# Patient Record
Sex: MALE | Race: BLACK OR AFRICAN AMERICAN | NOT HISPANIC OR LATINO | Employment: UNEMPLOYED | URBAN - METROPOLITAN AREA
[De-identification: names, ages, dates, MRNs, and addresses within clinical notes are randomized per-mention and may not be internally consistent; named-entity substitution may affect disease eponyms.]

---

## 2023-06-21 ENCOUNTER — HOSPITAL ENCOUNTER (INPATIENT)
Facility: HOSPITAL | Age: 23
LOS: 2 days | DRG: 817 | End: 2023-06-23
Attending: EMERGENCY MEDICINE | Admitting: STUDENT IN AN ORGANIZED HEALTH CARE EDUCATION/TRAINING PROGRAM
Payer: COMMERCIAL

## 2023-06-21 ENCOUNTER — APPOINTMENT (EMERGENCY)
Dept: RADIOLOGY | Facility: HOSPITAL | Age: 23
DRG: 817 | End: 2023-06-21
Payer: COMMERCIAL

## 2023-06-21 ENCOUNTER — APPOINTMENT (EMERGENCY)
Dept: CT IMAGING | Facility: HOSPITAL | Age: 23
DRG: 817 | End: 2023-06-21
Payer: COMMERCIAL

## 2023-06-21 DIAGNOSIS — T50.901A OVERDOSE: Primary | ICD-10-CM

## 2023-06-21 DIAGNOSIS — R45.851 SUICIDAL IDEATION: ICD-10-CM

## 2023-06-21 DIAGNOSIS — E43 SEVERE PROTEIN-CALORIE MALNUTRITION (HCC): ICD-10-CM

## 2023-06-21 PROBLEM — T44.3X1A ANTICHOLINERGIC DRUG OVERDOSE: Status: ACTIVE | Noted: 2023-06-21

## 2023-06-21 PROBLEM — R94.31 PROLONGED Q-T INTERVAL ON ECG: Status: ACTIVE | Noted: 2023-06-21

## 2023-06-21 PROBLEM — J96.00 ACUTE RESPIRATORY FAILURE (HCC): Status: ACTIVE | Noted: 2023-06-21

## 2023-06-21 PROBLEM — E87.20 METABOLIC ACIDOSIS: Status: ACTIVE | Noted: 2023-06-21

## 2023-06-21 LAB
ALBUMIN SERPL BCP-MCNC: 4.4 G/DL (ref 3.5–5)
ALP SERPL-CCNC: 38 U/L (ref 34–104)
ALT SERPL W P-5'-P-CCNC: 16 U/L (ref 7–52)
AMPHETAMINES SERPL QL SCN: NEGATIVE
ANION GAP SERPL CALCULATED.3IONS-SCNC: 19 MMOL/L
ANION GAP SERPL CALCULATED.3IONS-SCNC: 6 MMOL/L
APAP SERPL-MCNC: <10 UG/ML (ref 10–20)
AST SERPL W P-5'-P-CCNC: 25 U/L (ref 13–39)
BARBITURATES UR QL: NEGATIVE
BASE EX.OXY STD BLDV CALC-SCNC: 92.8 % (ref 60–80)
BASE EX.OXY STD BLDV CALC-SCNC: 97.3 % (ref 60–80)
BASE EXCESS BLDV CALC-SCNC: -0.3 MMOL/L
BASE EXCESS BLDV CALC-SCNC: -4.1 MMOL/L
BASOPHILS # BLD AUTO: 0.02 THOUSANDS/ÂΜL (ref 0–0.1)
BASOPHILS NFR BLD AUTO: 1 % (ref 0–1)
BENZODIAZ UR QL: NEGATIVE
BILIRUB DIRECT SERPL-MCNC: 0.14 MG/DL (ref 0–0.2)
BILIRUB SERPL-MCNC: 0.73 MG/DL (ref 0.2–1)
BUN SERPL-MCNC: 10 MG/DL (ref 5–25)
BUN SERPL-MCNC: 13 MG/DL (ref 5–25)
CALCIUM SERPL-MCNC: 8.2 MG/DL (ref 8.4–10.2)
CALCIUM SERPL-MCNC: 8.3 MG/DL (ref 8.4–10.2)
CHLORIDE SERPL-SCNC: 106 MMOL/L (ref 96–108)
CHLORIDE SERPL-SCNC: 107 MMOL/L (ref 96–108)
CK SERPL-CCNC: 248 U/L (ref 39–308)
CK SERPL-CCNC: 252 U/L (ref 39–308)
CO2 SERPL-SCNC: 13 MMOL/L (ref 21–32)
CO2 SERPL-SCNC: 23 MMOL/L (ref 21–32)
COCAINE UR QL: NEGATIVE
CREAT SERPL-MCNC: 1.13 MG/DL (ref 0.6–1.3)
CREAT SERPL-MCNC: 1.26 MG/DL (ref 0.6–1.3)
EOSINOPHIL # BLD AUTO: 0.02 THOUSAND/ÂΜL (ref 0–0.61)
EOSINOPHIL NFR BLD AUTO: 1 % (ref 0–6)
ERYTHROCYTE [DISTWIDTH] IN BLOOD BY AUTOMATED COUNT: 12 % (ref 11.6–15.1)
ETHANOL SERPL-MCNC: <10 MG/DL
GFR SERPL CREATININE-BSD FRML MDRD: 79 ML/MIN/1.73SQ M
GFR SERPL CREATININE-BSD FRML MDRD: 90 ML/MIN/1.73SQ M
GLUCOSE SERPL-MCNC: 121 MG/DL (ref 65–140)
GLUCOSE SERPL-MCNC: 132 MG/DL (ref 65–140)
GLUCOSE SERPL-MCNC: 98 MG/DL (ref 65–140)
HCO3 BLDV-SCNC: 18.6 MMOL/L (ref 24–30)
HCO3 BLDV-SCNC: 23 MMOL/L (ref 24–30)
HCT VFR BLD AUTO: 40.4 % (ref 36.5–49.3)
HGB BLD-MCNC: 13.8 G/DL (ref 12–17)
IMM GRANULOCYTES # BLD AUTO: 0.04 THOUSAND/UL (ref 0–0.2)
IMM GRANULOCYTES NFR BLD AUTO: 1 % (ref 0–2)
LACTATE SERPL-SCNC: 1.9 MMOL/L (ref 0.5–2)
LYMPHOCYTES # BLD AUTO: 0.59 THOUSANDS/ÂΜL (ref 0.6–4.47)
LYMPHOCYTES NFR BLD AUTO: 16 % (ref 14–44)
MAGNESIUM SERPL-MCNC: 2.3 MG/DL (ref 1.9–2.7)
MCH RBC QN AUTO: 30.8 PG (ref 26.8–34.3)
MCHC RBC AUTO-ENTMCNC: 34.2 G/DL (ref 31.4–37.4)
MCV RBC AUTO: 90 FL (ref 82–98)
METHADONE UR QL: NEGATIVE
MONOCYTES # BLD AUTO: 0.28 THOUSAND/ÂΜL (ref 0.17–1.22)
MONOCYTES NFR BLD AUTO: 7 % (ref 4–12)
NEUTROPHILS # BLD AUTO: 2.83 THOUSANDS/ÂΜL (ref 1.85–7.62)
NEUTS SEG NFR BLD AUTO: 74 % (ref 43–75)
NRBC BLD AUTO-RTO: 0 /100 WBCS
O2 CT BLDV-SCNC: 19.2 ML/DL
O2 CT BLDV-SCNC: 20.1 ML/DL
OPIATES UR QL SCN: NEGATIVE
OXYCODONE+OXYMORPHONE UR QL SCN: NEGATIVE
PCO2 BLDV: 28.1 MM HG (ref 42–50)
PCO2 BLDV: 33.9 MM HG (ref 42–50)
PCP UR QL: NEGATIVE
PH BLDV: 7.44 [PH] (ref 7.3–7.4)
PH BLDV: 7.45 [PH] (ref 7.3–7.4)
PLATELET # BLD AUTO: 205 THOUSANDS/UL (ref 149–390)
PLATELET # BLD AUTO: 210 THOUSANDS/UL (ref 149–390)
PMV BLD AUTO: 9.6 FL (ref 8.9–12.7)
PMV BLD AUTO: 9.9 FL (ref 8.9–12.7)
PO2 BLDV: 169 MM HG (ref 35–45)
PO2 BLDV: 61.2 MM HG (ref 35–45)
POTASSIUM SERPL-SCNC: 3.5 MMOL/L (ref 3.5–5.3)
POTASSIUM SERPL-SCNC: 4.5 MMOL/L (ref 3.5–5.3)
PROT SERPL-MCNC: 7.5 G/DL (ref 6.4–8.4)
RBC # BLD AUTO: 4.48 MILLION/UL (ref 3.88–5.62)
SALICYLATES SERPL-MCNC: <5 MG/DL (ref 3–20)
SODIUM SERPL-SCNC: 136 MMOL/L (ref 135–147)
SODIUM SERPL-SCNC: 138 MMOL/L (ref 135–147)
THC UR QL: NEGATIVE
WBC # BLD AUTO: 3.78 THOUSAND/UL (ref 4.31–10.16)

## 2023-06-21 PROCEDURE — 71260 CT THORAX DX C+: CPT

## 2023-06-21 PROCEDURE — 5A1935Z RESPIRATORY VENTILATION, LESS THAN 24 CONSECUTIVE HOURS: ICD-10-PCS | Performed by: INTERNAL MEDICINE

## 2023-06-21 PROCEDURE — 80179 DRUG ASSAY SALICYLATE: CPT | Performed by: EMERGENCY MEDICINE

## 2023-06-21 PROCEDURE — G1004 CDSM NDSC: HCPCS

## 2023-06-21 PROCEDURE — 0BH17EZ INSERTION OF ENDOTRACHEAL AIRWAY INTO TRACHEA, VIA NATURAL OR ARTIFICIAL OPENING: ICD-10-PCS | Performed by: INTERNAL MEDICINE

## 2023-06-21 PROCEDURE — 85025 COMPLETE CBC W/AUTO DIFF WBC: CPT | Performed by: EMERGENCY MEDICINE

## 2023-06-21 PROCEDURE — 82550 ASSAY OF CK (CPK): CPT | Performed by: EMERGENCY MEDICINE

## 2023-06-21 PROCEDURE — 99285 EMERGENCY DEPT VISIT HI MDM: CPT

## 2023-06-21 PROCEDURE — 82077 ASSAY SPEC XCP UR&BREATH IA: CPT | Performed by: EMERGENCY MEDICINE

## 2023-06-21 PROCEDURE — 74177 CT ABD & PELVIS W/CONTRAST: CPT

## 2023-06-21 PROCEDURE — 99449 NTRPROF PH1/NTRNET/EHR 31/>: CPT | Performed by: EMERGENCY MEDICINE

## 2023-06-21 PROCEDURE — 94760 N-INVAS EAR/PLS OXIMETRY 1: CPT

## 2023-06-21 PROCEDURE — 36415 COLL VENOUS BLD VENIPUNCTURE: CPT | Performed by: EMERGENCY MEDICINE

## 2023-06-21 PROCEDURE — 83735 ASSAY OF MAGNESIUM: CPT | Performed by: STUDENT IN AN ORGANIZED HEALTH CARE EDUCATION/TRAINING PROGRAM

## 2023-06-21 PROCEDURE — 80076 HEPATIC FUNCTION PANEL: CPT | Performed by: EMERGENCY MEDICINE

## 2023-06-21 PROCEDURE — 85049 AUTOMATED PLATELET COUNT: CPT

## 2023-06-21 PROCEDURE — 93005 ELECTROCARDIOGRAM TRACING: CPT

## 2023-06-21 PROCEDURE — 71045 X-RAY EXAM CHEST 1 VIEW: CPT

## 2023-06-21 PROCEDURE — 80143 DRUG ASSAY ACETAMINOPHEN: CPT | Performed by: EMERGENCY MEDICINE

## 2023-06-21 PROCEDURE — 82805 BLOOD GASES W/O2 SATURATION: CPT

## 2023-06-21 PROCEDURE — 99291 CRITICAL CARE FIRST HOUR: CPT | Performed by: EMERGENCY MEDICINE

## 2023-06-21 PROCEDURE — 80048 BASIC METABOLIC PNL TOTAL CA: CPT | Performed by: EMERGENCY MEDICINE

## 2023-06-21 PROCEDURE — 70450 CT HEAD/BRAIN W/O DYE: CPT

## 2023-06-21 PROCEDURE — 83605 ASSAY OF LACTIC ACID: CPT

## 2023-06-21 PROCEDURE — 80307 DRUG TEST PRSMV CHEM ANLYZR: CPT | Performed by: EMERGENCY MEDICINE

## 2023-06-21 PROCEDURE — 80048 BASIC METABOLIC PNL TOTAL CA: CPT | Performed by: STUDENT IN AN ORGANIZED HEALTH CARE EDUCATION/TRAINING PROGRAM

## 2023-06-21 PROCEDURE — 94002 VENT MGMT INPAT INIT DAY: CPT

## 2023-06-21 PROCEDURE — 82948 REAGENT STRIP/BLOOD GLUCOSE: CPT

## 2023-06-21 PROCEDURE — 82550 ASSAY OF CK (CPK): CPT | Performed by: STUDENT IN AN ORGANIZED HEALTH CARE EDUCATION/TRAINING PROGRAM

## 2023-06-21 PROCEDURE — 99291 CRITICAL CARE FIRST HOUR: CPT | Performed by: STUDENT IN AN ORGANIZED HEALTH CARE EDUCATION/TRAINING PROGRAM

## 2023-06-21 RX ORDER — SODIUM CHLORIDE, SODIUM GLUCONATE, SODIUM ACETATE, POTASSIUM CHLORIDE, MAGNESIUM CHLORIDE, SODIUM PHOSPHATE, DIBASIC, AND POTASSIUM PHOSPHATE .53; .5; .37; .037; .03; .012; .00082 G/100ML; G/100ML; G/100ML; G/100ML; G/100ML; G/100ML; G/100ML
125 INJECTION, SOLUTION INTRAVENOUS CONTINUOUS
Status: DISCONTINUED | OUTPATIENT
Start: 2023-06-21 | End: 2023-06-22

## 2023-06-21 RX ORDER — PROPOFOL 10 MG/ML
5-50 INJECTION, EMULSION INTRAVENOUS
Status: DISCONTINUED | OUTPATIENT
Start: 2023-06-21 | End: 2023-06-22

## 2023-06-21 RX ORDER — ENOXAPARIN SODIUM 100 MG/ML
40 INJECTION SUBCUTANEOUS DAILY
Status: DISCONTINUED | OUTPATIENT
Start: 2023-06-21 | End: 2023-06-23 | Stop reason: HOSPADM

## 2023-06-21 RX ORDER — CHLORHEXIDINE GLUCONATE ORAL RINSE 1.2 MG/ML
15 SOLUTION DENTAL EVERY 12 HOURS SCHEDULED
Status: DISCONTINUED | OUTPATIENT
Start: 2023-06-21 | End: 2023-06-23 | Stop reason: HOSPADM

## 2023-06-21 RX ORDER — FENTANYL CITRATE-0.9 % NACL/PF 10 MCG/ML
75 PLASTIC BAG, INJECTION (ML) INTRAVENOUS CONTINUOUS
Status: DISCONTINUED | OUTPATIENT
Start: 2023-06-21 | End: 2023-06-22

## 2023-06-21 RX ORDER — PROPOFOL 10 MG/ML
5-50 INJECTION, EMULSION INTRAVENOUS
Status: DISCONTINUED | OUTPATIENT
Start: 2023-06-21 | End: 2023-06-21

## 2023-06-21 RX ORDER — FAMOTIDINE 40 MG/5ML
20 POWDER, FOR SUSPENSION ORAL 2 TIMES DAILY
Status: DISCONTINUED | OUTPATIENT
Start: 2023-06-21 | End: 2023-06-22

## 2023-06-21 RX ORDER — MIDAZOLAM HYDROCHLORIDE 2 MG/2ML
2 INJECTION, SOLUTION INTRAMUSCULAR; INTRAVENOUS EVERY 4 HOURS PRN
Status: DISCONTINUED | OUTPATIENT
Start: 2023-06-21 | End: 2023-06-22

## 2023-06-21 RX ORDER — ACETAMINOPHEN 160 MG/5ML
650 SUSPENSION ORAL EVERY 4 HOURS PRN
Status: DISCONTINUED | OUTPATIENT
Start: 2023-06-21 | End: 2023-06-22

## 2023-06-21 RX ORDER — SODIUM CHLORIDE, SODIUM GLUCONATE, SODIUM ACETATE, POTASSIUM CHLORIDE, MAGNESIUM CHLORIDE, SODIUM PHOSPHATE, DIBASIC, AND POTASSIUM PHOSPHATE .53; .5; .37; .037; .03; .012; .00082 G/100ML; G/100ML; G/100ML; G/100ML; G/100ML; G/100ML; G/100ML
1000 INJECTION, SOLUTION INTRAVENOUS ONCE
Status: COMPLETED | OUTPATIENT
Start: 2023-06-21 | End: 2023-06-21

## 2023-06-21 RX ORDER — ETOMIDATE 2 MG/ML
20 INJECTION INTRAVENOUS ONCE
Status: COMPLETED | OUTPATIENT
Start: 2023-06-21 | End: 2023-06-21

## 2023-06-21 RX ORDER — SUCCINYLCHOLINE/SOD CL,ISO/PF 100 MG/5ML
1.5 SYRINGE (ML) INTRAVENOUS ONCE
Status: COMPLETED | OUTPATIENT
Start: 2023-06-21 | End: 2023-06-21

## 2023-06-21 RX ORDER — LORAZEPAM 2 MG/ML
1 INJECTION INTRAMUSCULAR ONCE
Status: COMPLETED | OUTPATIENT
Start: 2023-06-21 | End: 2023-06-21

## 2023-06-21 RX ORDER — POTASSIUM CHLORIDE 14.9 MG/ML
20 INJECTION INTRAVENOUS ONCE
Status: COMPLETED | OUTPATIENT
Start: 2023-06-21 | End: 2023-06-21

## 2023-06-21 RX ADMIN — PROPOFOL 30 MCG/KG/MIN: 10 INJECTION, EMULSION INTRAVENOUS at 13:56

## 2023-06-21 RX ADMIN — CHLORHEXIDINE GLUCONATE 0.12% ORAL RINSE 15 ML: 1.2 LIQUID ORAL at 12:43

## 2023-06-21 RX ADMIN — ACETAMINOPHEN 650 MG: 650 SUSPENSION ORAL at 16:31

## 2023-06-21 RX ADMIN — FAMOTIDINE 20 MG: 40 POWDER, FOR SUSPENSION ORAL at 14:01

## 2023-06-21 RX ADMIN — Medication 150 MG: at 09:46

## 2023-06-21 RX ADMIN — ENOXAPARIN SODIUM 40 MG: 40 INJECTION SUBCUTANEOUS at 12:43

## 2023-06-21 RX ADMIN — LORAZEPAM 1 MG: 2 INJECTION INTRAMUSCULAR; INTRAVENOUS at 12:56

## 2023-06-21 RX ADMIN — Medication 75 MCG/HR: at 21:41

## 2023-06-21 RX ADMIN — CHLORHEXIDINE GLUCONATE 0.12% ORAL RINSE 15 ML: 1.2 LIQUID ORAL at 22:00

## 2023-06-21 RX ADMIN — IOHEXOL 100 ML: 350 INJECTION, SOLUTION INTRAVENOUS at 10:16

## 2023-06-21 RX ADMIN — POTASSIUM CHLORIDE 20 MEQ: 14.9 INJECTION, SOLUTION INTRAVENOUS at 21:00

## 2023-06-21 RX ADMIN — PROPOFOL 50 MCG/KG/MIN: 10 INJECTION, EMULSION INTRAVENOUS at 19:41

## 2023-06-21 RX ADMIN — ETOMIDATE 30 MG: 2 INJECTION INTRAVENOUS at 09:45

## 2023-06-21 RX ADMIN — SODIUM CHLORIDE, SODIUM GLUCONATE, SODIUM ACETATE, POTASSIUM CHLORIDE, MAGNESIUM CHLORIDE, SODIUM PHOSPHATE, DIBASIC, AND POTASSIUM PHOSPHATE 1000 ML: .53; .5; .37; .037; .03; .012; .00082 INJECTION, SOLUTION INTRAVENOUS at 21:00

## 2023-06-21 RX ADMIN — PROPOFOL 5 MCG/KG/MIN: 10 INJECTION, EMULSION INTRAVENOUS at 09:41

## 2023-06-21 RX ADMIN — SODIUM CHLORIDE, SODIUM GLUCONATE, SODIUM ACETATE, POTASSIUM CHLORIDE, MAGNESIUM CHLORIDE, SODIUM PHOSPHATE, DIBASIC, AND POTASSIUM PHOSPHATE 100 ML/HR: .53; .5; .37; .037; .03; .012; .00082 INJECTION, SOLUTION INTRAVENOUS at 12:23

## 2023-06-21 RX ADMIN — PROPOFOL 50 MCG/KG/MIN: 10 INJECTION, EMULSION INTRAVENOUS at 20:20

## 2023-06-21 NOTE — RESPIRATORY THERAPY NOTE
RT Ventilator Management Note  Sue Duenas 24 y o  male MRN: [id number]  Unit/Bed#:  Encounter: [id number]      Daily Screen         6/21/2023  0931             Patient safety screen outcome[de-identified] Failed    Not Ready for Weaning due to[de-identified] Underline problem not resolved              Physical Exam:   Assessment Type: Assess only  General Appearance: Sedated  Respiratory Pattern: Assisted  Chest Assessment: Chest expansion symmetrical  Bilateral Breath Sounds: Clear  Suction: ET Tube      Resp Comments: decreased rate to 14       06/21/23 1534   Respiratory Assessment   Resp Comments decreased rate to 14   Vent Information   Vent ID Saundra   Vent type Drager   Drager Vent Mode AC/VC+   $ Pulse Oximetry Spot Check Charge Completed   SpO2 100 %   AC/VC+ Settings   Resp Rate (BPM) (S)  14 BPM   VT (mL) 400 mL   Insp Time (S) 1 S   FIO2 (%) 40 %   PEEP (cmH2O) 6 cmH2O   Rise Time (%) 0 2 %   Trigger Sensitivity Flow (LPM) 2 LPM   Humidification Heater   Heater Temp 98 6 °F (37 °C)   AC/VC+ Actuals   Resp Rate (BPM) 14 BPM   VT (mL) 400 mL   MV (Obs) 4 91   MAP (cmH2O) 8 2 cmH2O   Peak Pressure (cmH2O) 15 cmH2O   I:E Ratio (Obs) 1:3 3   Static Compliance (mL/cmH20) 56 mL/cmH2O   Plateau Pressure (cm H2O) 14 5 cm H2O   Heater Temperature (Obs) 99 5 °F (37 5 °C)   AC/VC+ ALARMS   High Peak Pressure (cmH2O) 45 cmH2O   High Resp Rate (BPM) 35 BPM   High MV (L/min) 11 L/min   High VT (mL) 900 mL   Maintenance   Alarm (pink) cable attached Yes   Resuscitation bag with peep valve at bedside Yes   Water bag changed No   Circuit changed No   ETT  7 mm   Placement Date/Time: 06/21/23 0930   Tube Size: 7 mm  Insertion attempts: 1  Placement Verification: End tidal CO2  Secured at (cm): 24 lip  Placed By: Physician   Secured at (cm) 24   Measured from [address]   Repositioned (S)  Center to Right   Secured by Commercial tube mcconnell   Site Condition Dry   Cuff Pressure (cm H2O)   (green)   HI-LO Suction  Continuous low suction   HI-LO Secretions Scant   HI-LO Intervention Patent

## 2023-06-21 NOTE — ED PROVIDER NOTES
History  Chief Complaint   Patient presents with   • Overdose - Accidental     Pt arrived via EMS after taking 30 50 mg benadryl to take a break , pt has lacerations on his right arm upon arrival  Pt was given Zofran in transport  EMS reports seizure activity during transport  HPI patient is a 43-year-old male, arrives via EMS reports patient took 30 tablets of 50 mg of Benadryl this morning to take a break  He denied a suicide attempt for them  Patient apparently was ambulatory according to police and then verbal according to EMS and then had a tonic-clonic terminal seizure followed by some vomiting  Arrives here obtunded  Appears to move his upper extremities spontaneously  Patient some eye movements looking around, pupils are constricted  His membranes were dry  Was unable to give a history  Apparently found with his girlfriend but his wife called from [place]  Because patient was unresponsive unable to control his airway he was intubated on arrival   Past medical history unknown  Family history unknown  Social history unknown      None       No past medical history on file  No past surgical history on file  No family history on file  I have reviewed and agree with the history as documented  No existing history information found  No existing history information found  Review of Systems   Unable to perform ROS: Intubated       Physical Exam  Physical Exam  Constitutional:       Comments: Unresponsive, occasionally looking around, I saw some spontaneous movement of the upper extremities but none of the lower extremities  Nonverbal, does not seem to respond to pain   HENT:      Head: Normocephalic  Nose: Nose normal       Mouth/Throat:      Mouth: Mucous membranes are dry  Comments: Very dry mucous membranes  Eyes:      Comments: Pinpoint extraocular motions intact   Cardiovascular:      Rate and Rhythm: Regular rhythm  Tachycardia present  Pulses: Normal pulses  Heart sounds: Normal heart sounds  Pulmonary:      Effort: Pulmonary effort is normal       Breath sounds: Normal breath sounds  Abdominal:      General: Abdomen is flat  Bowel sounds are normal       Tenderness: There is no abdominal tenderness  Musculoskeletal:         General: Normal range of motion  Cervical back: Normal range of motion  Skin:     Comments: Superficial lacerations of the left arm, cutting consistent with self cutting, EMS reports patient admitted to cutting himself   Neurological:      GCS: GCS eye subscore is 1  GCS verbal subscore is 1  GCS motor subscore is 1  Comments: Apparently ambulatory at the scene, somnolent for EMS and then apparently had a seizure now postictal versus somnolent from his overdose  Appears unresponsive to most painful stimuli    Did apparently have some spontaneous movement with intubation         Vital Signs  ED Triage Vitals   Temperature Pulse Respirations Blood Pressure SpO2   06/21/23 0947 06/21/23 0927 06/21/23 0927 06/21/23 0927 06/21/23 0927   (!) 96 6 °F (35 9 °C) (!) 138 (!) 23 123/58 100 %      Temp src Heart Rate Source Patient Position - Orthostatic VS BP Location FiO2 (%)   -- 06/21/23 1015 06/21/23 0927 06/21/23 0927 --    Monitor Lying Right arm       Pain Score       --                  Vitals:    06/21/23 0927 06/21/23 0942 06/21/23 1015   BP: 123/58 105/58 133/62   Pulse: (!) 138 (!) 126 (!) 109   Patient Position - Orthostatic VS: Lying Lying          Visual Acuity  Visual Acuity    Flowsheet Row Most Recent Value   L Pupil Size (mm) 3   R Pupil Size (mm) 3          ED Medications  Medications   propofol (DIPRIVAN) 1000 mg in 100 mL infusion (premix) (15 mcg/kg/min × 65 1 kg Intravenous Rate/Dose Change 6/21/23 1003)   Succinylcholine Chloride 100 mg/5 mL syringe 98 mg (150 mg Intravenous Given 6/21/23 0946)   etomidate (AMIDATE) 2 mg/mL injection 20 mg (30 mg Intravenous Given 6/21/23 0945)   iohexol (OMNIPAQUE) 350 MG/ML injection (SINGLE-DOSE) 100 mL (100 mL Intravenous Given 6/21/23 1016)       Diagnostic Studies  Results Reviewed     Procedure Component Value Units Date/Time    CBC and differential [[id number]]  (Abnormal) Collected: 06/21/23 1029    Lab Status: Final result Specimen: Blood from Arm, Left Updated: 06/21/23 1036     WBC 3 78 Thousand/uL      RBC 4 48 Million/uL      Hemoglobin 13 8 g/dL      Hematocrit 40 4 %      MCV 90 fL      MCH 30 8 pg      MCHC 34 2 g/dL      RDW 12 0 %      MPV 9 6 fL      Platelets 826 Thousands/uL      nRBC 0 /100 WBCs      Neutrophils Relative 74 %      Immat GRANS % 1 %      Lymphocytes Relative 16 %      Monocytes Relative 7 %      Eosinophils Relative 1 %      Basophils Relative 1 %      Neutrophils Absolute 2 83 Thousands/µL      Immature Grans Absolute 0 04 Thousand/uL      Lymphocytes Absolute 0 59 Thousands/µL      Monocytes Absolute 0 28 Thousand/µL      Eosinophils Absolute 0 02 Thousand/µL      Basophils Absolute 0 02 Thousands/µL     Basic metabolic panel [[id number]]  (Abnormal) Collected: 06/21/23 0947    Lab Status: Final result Specimen: Blood from Arm, Right Updated: 06/21/23 1029     Sodium 138 mmol/L      Potassium 4 5 mmol/L      Chloride 106 mmol/L      CO2 13 mmol/L      ANION GAP 19 mmol/L      BUN 13 mg/dL      Creatinine 1 26 mg/dL      Glucose 132 mg/dL      Calcium 8 3 mg/dL      eGFR 79 ml/min/1 73sq m     Narrative:      [place] guidelines for Chronic Kidney Disease (CKD):   •  Stage 1 with normal or high GFR (GFR > 90 mL/min/1 73 square meters)  •  Stage 2 Mild CKD (GFR = 60-89 mL/min/1 73 square meters)  •  Stage 3A Moderate CKD (GFR = 45-59 mL/min/1 73 square meters)  •  Stage 3B Moderate CKD (GFR = 30-44 mL/min/1 73 square meters)  •  Stage 4 Severe CKD (GFR = 15-29 mL/min/1 73 square meters)  •  Stage 5 End Stage CKD (GFR <15 mL/min/1 73 square meters)  Note: GFR calculation is accurate only with a steady state creatinine "Hepatic function panel [[id number]]  (Normal) Collected: 06/21/23 0947    Lab Status: Final result Specimen: Blood from Arm, Right Updated: 06/21/23 1029     Total Bilirubin 0 73 mg/dL      Bilirubin, Direct 0 14 mg/dL      Alkaline Phosphatase 38 U/L      AST 25 U/L      ALT 16 U/L      Total Protein 7 5 g/dL      Albumin 4 4 g/dL     CK [[id number]]  (Normal) Collected: 06/21/23 0947    Lab Status: Final result Specimen: Blood from Arm, Right Updated: 06/21/23 1029     Total  U/L     Salicylate level [[id number]]  (Normal) Collected: 06/21/23 0947    Lab Status: Final result Specimen: Blood from Arm, Right Updated: 69/75/96 0138     Salicylate Lvl <5 mg/dL     Acetaminophen level-\"If concentration is detectable, please discuss with medical  on call  \" [[id number]]  (Abnormal) Collected: 06/21/23 0947    Lab Status: Final result Specimen: Blood from Arm, Right Updated: 06/21/23 1028     Acetaminophen Level <10 ug/mL     Ethanol [[id number]]  (Normal) Collected: 06/21/23 0947    Lab Status: Final result Specimen: Blood from Arm, Right Updated: 06/21/23 1026     Ethanol Lvl <10 mg/dL     Rapid drug screen, urine [[id number]]  (Normal) Collected: 06/21/23 0947    Lab Status: Final result Specimen: Urine, Catheter Updated: 06/21/23 1020     Amph/Meth UR Negative     Barbiturate Ur Negative     Benzodiazepine Urine Negative     Cocaine Urine Negative     Methadone Urine Negative     Opiate Urine Negative     PCP Ur Negative     THC Urine Negative     Oxycodone Urine Negative    Narrative:      FOR MEDICAL PURPOSES ONLY  IF CONFIRMATION NEEDED PLEASE CONTACT THE LAB WITHIN 5 DAYS      Drug Screen Cutoff Levels:  AMPHETAMINE/METHAMPHETAMINES  1000 ng/mL  BARBITURATES     200 ng/mL  BENZODIAZEPINES     200 ng/mL  COCAINE      300 ng/mL  METHADONE      300 ng/mL  OPIATES      300 ng/mL  PHENCYCLIDINE     25 ng/mL  THC       50 ng/mL  OXYCODONE      100 ng/mL    Fingerstick Glucose (POCT) [[id number]]  (Normal) " Collected: 06/21/23 0933    Lab Status: Final result Updated: 06/21/23 0934     POC Glucose 121 mg/dl                  CT head without contrast   Final Result by Juliann Mckay MD (06/21 1044)      No acute intracranial abnormality  Workstation performed: [id number]         CT chest abdomen pelvis w contrast   Final Result by Sydney Andrade MD (06/21 1042)      No acute findings in the chest, abdomen, or pelvis  Workstation performed: [id number]         XR chest 1 view portable   Final Result by Margarette Fallon MD (06/21 1001)      ETT and NGT in place      No acute cardiopulmonary disease  Workstation performed: [id number]                    Procedures  ECG 12 Lead Documentation Only    Date/Time: 6/21/2023 9:51 AM    Performed by: Audie Daniels MD  Authorized by: Audie Daniels MD    Indications / Diagnosis:  OverDose, rule out metabolic issues  Previous ECG:     Previous ECG:  Unavailable  Interpretation:     Interpretation: abnormal    Rate:     ECG rate:  141    ECG rate assessment: normal    Rhythm:     Rhythm: sinus rhythm    Comments:      Sinus tachycardia, no QRS prolongation, no QT shortening  Intubation    Date/Time: 6/21/2023 9:53 AM    Performed by: Audie Daniels MD  Authorized by: Audie Daniels MD    Patient location:  ED  Consent:     Consent obtained:  Emergent situation    Risks discussed:  Aspiration  Universal protocol:     Patient identity confirmed:  Hospital-assigned identification number  Pre-procedure details:     Patient status:  Unresponsive    Mallampati score:  2    Pretreatment medications:  Etomidate    Paralytics:  Succinylcholine  Indications:     Indications for intubation: airway protection    Procedure details:     Preoxygenation:  Bag valve mask    CPR in progress: no      Intubation method:  Oral    Oral intubation technique:  Direct    Laryngoscope blade:   Mac 3    Tube size (mm):  7 0    Tube type:  Cuffed    Number of attempts:  1    Ventilation between attempts: no      Cricoid pressure: no      Tube visualized through cords: yes    Placement assessment:     ETT to lip:  25    ETT to teeth:  24    Tube secured with:  ETT mcconnell    Breath sounds:  Equal    Placement verification: chest rise and colorimetric ETCO2 device      CXR findings:  ETT in proper place  Post-procedure details:     Patient tolerance of procedure: Tolerated well, no immediate complications  CriticalCare Time    Date/Time: 6/21/2023 10:44 AM    Performed by: Nancy Sharma MD  Authorized by: Nancy Sharma MD    Critical care provider statement:     Critical care time (minutes):  60    Critical care start time:  6/21/2023 9:44 AM    Critical care end time:  6/21/2023 10:44 AM    Critical care time was exclusive of:  Separately billable procedures and treating other patients and teaching time    Critical care was necessary to treat or prevent imminent or life-threatening deterioration of the following conditions:  Respiratory failure and toxidrome    Critical care was time spent personally by me on the following activities:  Obtaining history from patient or surrogate, development of treatment plan with patient or surrogate, discussions with consultants, evaluation of patient's response to treatment, examination of patient, ventilator management, re-evaluation of patient's condition, ordering and review of radiographic studies and ordering and review of laboratory studies             ED Course      Chest x-ray: Chest x-ray showed a normal cardiac silhouette, no pneumothorax no infiltrates, ET tube is in good position no sign of pathology, interpreted by me, I was the primary   Other diagnostic testing showed white count of 3 7 actually reduced no sign of inflammation hemoglobin is normal 13 no sign of anemia  Electrolytes showed a normal serum sodium 138, anion gap 19, calcium minimally reduced at 8 3    Liver functions were normal   Total CK was not markedly elevated, salicylate and Tylenol were negative  Ethanol was negative  Drug screen was negative  Sugar was within normal limits  CT scan of the brain showed no acute pathology  CT scan of the abdomen pelvis showed no acute findings  Discussed the case with critical care and with toxicology  SBIRT 22yo+    Flowsheet Row Most Recent Value   Initial Alcohol Screen: US AUDIT-C     1  How often do you have a drink containing alcohol? 1 Filed at: 06/21/2023 1046   2  How many drinks containing alcohol do you have on a typical day you are drinking? 0 Filed at: 06/21/2023 1046   3a  Male UNDER 65: How often do you have five or more drinks on one occasion? 0 Filed at: 06/21/2023 1046   Audit-C Score 1 Filed at: 06/21/2023 1046   MOHIT: How many times in the past year have you    Used an illegal drug or used a prescription medication for non-medical reasons? Never Filed at: 06/21/2023 1046        I was able to talk with the woman who identified herself as the patient's wife when she arrived, she reports he took a nighttime sleeping aid which she has a picture of it appears to be Benadryl 50 mg tablets  She denies any other ingestion  She reports witnessing himself cut them self on his left arm with a knife at home  Medical Decision Making  Decision making 21-year-old male presents with an acute overdose, apparently ambulatory at the scene, according to police, EMS reports patient became increasingly somnolent and had a seizure for them  Apparently they were told by the patient that he took 30 tablets of 50 mg Benadryl to rest for a while  The patient's wife reports that he and she had an argument this morning  Unresponsive on arrival intubated by me  Discussed at length with critical care, spoke with toxicology  Overdose: acute illness or injury  Amount and/or Complexity of Data Reviewed  Labs: ordered  Radiology: ordered        Risk  Prescription drug management  Decision regarding hospitalization  Disposition  Final diagnoses:   Overdose     Time reflects when diagnosis was documented in both MDM as applicable and the Disposition within this note     Time User Action Codes Description Comment    6/21/2023  9:48 AM Elvie Birgit Ayala [T50 901A] Overdose       ED Disposition     ED Disposition   Admit    Condition   Stable    Date/Time   Wed Jun 21, 2023 10:38 AM    Comment   Case was discussed with critical care service and the patient's admission status was agreed to be 2 midnights to the service of Dr Nikhil Jimenez    None         Patient's Medications    No medications on file       No discharge procedures on file      PDMP Review     None          ED Provider  Electronically Signed by           Gurmeet Riggs MD  06/21/23 9485

## 2023-06-21 NOTE — H&P
"[address] [facility]  H&P  Name: Dory Gerardo 24 y o  male I MRN: [id number]  Unit/Bed#:  I Date of Admission: 6/21/2023   Date of Service: 6/21/2023 I Hospital Day: 0      Assessment/Plan   * Anticholinergic drug overdose  Assessment & Plan  · Patient alert on EMS arrival and reports taking thirty Benadryl 50 mg tablets to \"take a break\"  · Denies suicide attempt/ideation  · Evidence of self harm with cuts on his left arm  · Tonic clonic seizure en route to the hospital   · Exam consistent with anticholinergic OD  · Toxicology consulted, appreciate recommendations  · Nystagmus shortly after arrival to the ICU, given lorazepam 1 mg IVP  · Seizure precautions  · Plan for 1:1 continual observation and psych consult on extubation    Acute respiratory failure ([address] )  Assessment & Plan  · Intubated in the ED for airway protection  · Continue lung protective mechanical ventilation  · Monitor VBG  · Plan for SBT/SAT in the morning    Metabolic acidosis  Assessment & Plan  · CO2 13 on chemistry and VBG showing mixed metabolic/respiratory acidosis  · IV hydration  · Monitor repeat VBG later this evening and repeat chemistry in the morning    Prolonged Q-T interval on ECG  Assessment & Plan  · Initial QTc 554  · Repeat 427  · Per toxicology note give mag 2 g IV if remains >500, appears to be resolved now         History of Present Illness     HPI: Dory Gerardo is a 24 y  o  with no significant past medical history who presents with benadryl overdose of unclear intent  Patient was alert on arrival of EMS and admitted to taking thirty benadryl 50 mg tablets because he wanted to \"take a break  \" Of note patient found to have self harm cutting on his left arm  Patient denies suicidal ideation/attempt to EMS  Patient's mental status declined en route and he suffered a tonic-clonic seizure  On arrival to the ED patient was obtunded and was intubated for airway protection   Exam consistent with " "anticholinergic overdose, except for pupils which appear constricted  QTc prolonged at 554 on arrival  Patient is being admitted to the critical care department for further evaluation and management  History obtained from spouse and chart review  Review of Systems   Unable to perform ROS: Intubated       Historical Information   No past medical history on file  No past surgical history on file  No current outpatient medications No Known Allergies   Social History     Tobacco Use   • Smoking status: Some Days     Years: 5 00     Types: Cigarettes     Start date: 6/1/2016   • Smokeless tobacco: Never   Vaping Use   • Vaping Use: Never used   Substance Use Topics   • Alcohol use: Yes     Comment: \"occassionally\" per wife    History reviewed  No pertinent family history  Objective                            Vitals I/O      Most Recent Min/Max in 24hrs   Temp (!) 100 6 °F (38 1 °C) Temp  Min: 96 1 °F (35 6 °C)  Max: 100 6 °F (38 1 °C)   Pulse 99 Pulse  Min: 99  Max: 138   Resp 18 Resp  Min: 15  Max: 23   /64 BP  Min: 105/58  Max: 133/62   O2 Sat 100 % SpO2  Min: 99 %  Max: 100 %      Intake/Output Summary (Last 24 hours) at 6/21/2023 1631  Last data filed at 6/21/2023 1600  Gross per 24 hour   Intake 260 1 ml   Output 1155 ml   Net -894 9 ml         Diet NPO     Invasive Monitoring Physical exam    Physical Exam  Vitals reviewed  Constitutional:       Interventions: He is sedated, intubated and restrained  HENT:      Head: Normocephalic and atraumatic  Mouth/Throat:      Mouth: Mucous membranes are moist    Eyes:      Pupils: Pupils are equal, round, and reactive to light  Comments: Pupils constricted   Cardiovascular:      Rate and Rhythm: Regular rhythm  Tachycardia present  Pulses: Normal pulses  Heart sounds: Normal heart sounds  Pulmonary:      Effort: He is intubated  Breath sounds: Normal breath sounds  Abdominal:      General: There is no distension        " Palpations: Abdomen is soft  Musculoskeletal:      Right lower leg: No edema  Left lower leg: No edema  Skin:     General: Skin is warm and dry  Capillary Refill: Capillary refill takes less than 2 seconds  Neurological:      General: No focal deficit present  Mental Status: He is lethargic  Comments: Exam limited by intubation/sedation          Diagnostic Studies    EKG: Sinus tachycardia  Imaging:  I have personally reviewed pertinent reports  Medications:  Scheduled PRN   chlorhexidine, 15 mL, Q12H CORRINA  enoxaparin, 40 mg, Daily  famotidine, 20 mg, BID      acetaminophen, 650 mg, Q4H PRN       Continuous    multi-electrolyte, 100 mL/hr, Last Rate: 100 mL/hr (06/21/23 1223)  propofol, 5-50 mcg/kg/min, Last Rate: 40 mcg/kg/min (06/21/23 1500)         Labs:  CBC    Recent Labs     06/21/23  1029 06/21/23  1236   WBC 3 78*  --    HGB 13 8  --    HCT 40 4  --     210     BMP    Recent Labs     06/21/23  0947   SODIUM 138   K 4 5      CO2 13*   AGAP 19   BUN 13   CREATININE 1 26   CALCIUM 8 3*       Coags    No recent results     Additional Electrolytes  No recent results       Blood Gas    No recent results  Recent Labs     06/21/23  1304   PHVEN 7 438*   OUB9ESG 28 1*   PO2VEN 169 0*   PQV2JFG 18 6*   BEVEN -4 1    LFTs  Recent Labs     06/21/23  0947   ALT 16   AST 25   ALKPHOS 38   ALB 4 4   TBILI 0 73       Infectious  No recent results  Glucose  Recent Labs     06/21/23  0947   GLUC 132             Critical Care Time Delivered: Upon my evaluation, this patient had a high probability of imminent or life-threatening deterioration due to anticholinergic OD, which required my direct attention, intervention, and personal management  I have personally provided 20 minutes of critical care time, exclusive of procedures, teaching, family meetings, and any prior time recorded by providers other than myself     Anticipated Length of Stay is > 2 midnights  Farrah Vogt CRNP

## 2023-06-21 NOTE — CONSULTS
INTERPROFESSIONAL (PHONE) [facility] 1981 Toxicology  Eddie Lainez 24 y o  male MRN: [id number]  Unit/Bed#: TR 30 Encounter: [id number]      Reason for Consult / Principal Problem: suspected overdose  Inpatient consult to Toxicology  Consult performed by: Arely Patton MD  Consult ordered by: Juan Adorno MD        06/21/23      ASSESSMENT:  24year old male with suspected diphenhydramine overdose, intubated after seizure    RECOMMENDATIONS:  Diphenhydramine is an antihistamine with marked antimuscarinic properties found in many over the counter medications  The antimuscarinic nature of the drug may slow absorption so that duration of action may be prolonged  Patients typically present with sedation or agitated delirium, ataxia, mild tremor, mydraisis, dry skin, dry mucous membranes, tachycardia, hyperthermia, decreased bowel sounds, and urinary retention  In massive overdose, myocardial depression, rhabdomyolysis, seizure, and coma can occur  It is hepatically metabolized  Patients should have an ECG obtained, primarily looking for QRS widening due to its Na channel blockade  Sodium bicarbonate can be used for evidence of Na channel blockade (prolonged QRS, tall R in aVR)  Q1 EKGs while patient is tachycardic  - if QRS > 120, give 1-2 mEq per kg sodium bicarbonate  - goal: QRS < 120, titrate to pH 7 55, K > 4 0  - if QRS widening is refractory to sodium bicarbonate, please administer IV lidocaine or hypertonic saline    QTc was also noted to be 554, but this appears to be an overestimation  Monitor closely and if QTc remains > 500 and appears to be correct, please give 2 g IV Mg over 1 hour, optimize electrolyte, avoid QTc prolonging agents and continue to monitor with serial EKGs  The treatment is mainly supportive including IV fluids  Would aggressively monitor acidemia to ensure no worsening and appropriate response to fluids    Benzodiazepines for seizures, agitation, SBP > 180, HR > 150, T > 103F  Patient presented with constricted pupils, which is unusual which may or may not represent that the patient's ingestion is not as stated or may be a polyingestion  For further questions, please contact the medical  on call via [employer] or throughl the [employer]  Service or Patient [employer]  Please see additional teaching note below:    Medical Toxicology  [facility]  Anticholinergic Syndrome  Updated 03/04/2008    a- Background: Anticholinergic intoxication can occur from exposure to a wide variety of prescription and over-the-counter medications and numerous plants and mushrooms  Common drugs that have anticholinergic activities include: antihistamines, antipsychotics, antispasmodics, skeletal muscle relaxants, and tricyclic antidepressants (TCAs)  Plants and mushrooms containing anticholinergic alkaloids include: jimsonweed (Datura stramonium), deadly nightshade (Atropa belladonna), and fly agaric (Casa [place])  b- Mechanism of Toxicity:  • Competitively antagonize the effect of acetylcholine at peripheral muscarinic receptors  They mostly affect exocrine glands (such as sweating and salivation) and smooth muscle  Inhibition of muscarinic activities in the heart leads to a rapid heartbeat  • Pharmacokinetics: Absorption may be delayed due to its effect on GI motility  Duration of toxic effect can be quite prolonged (e g  benztropine  2-3 days)  c- Toxic dose: The range of toxicity is highly variable and unpredictable  • Examples of Fatal doses from case reports:  i  Young Child: 1-2 mg Atropine, instilled in the eye  ii  Adult: 32 mg Atropine, IM injection  • Minimal effect: increased heart rate and dry mouth after 360 mg of trospium chloride  d- Clinical presentation: Anticholinergic syndrome is characterized by warm, dry, flushed skin, dry mucous membranes, mydriasis, delirium, tachycardia, ileus, and urinary retention  Jerky myoclonic movements and choreoathetosis are common and can lead to rhabdomyolysis  Hyperthermia, coma, respiratory arrest and seizures may occur  e- Diagnosis: Based on history of exposure and typical features of anticholinergic syndrome  Trial dose of physostigmine can be used to confirm the diagnosis, especially with rapid reversal of the syndrome  f- Laboratory studies: Not generally available for the anticholinergics, but other labs can be useful such as electrolytes, glucose, CPK, and ECG  g- Treatment:   • ABC  • Seizure and muscular hyperactivity: Should be treated with benzodiazepines  Treat aggressively to prevent hyperthermia and rhabdomyolysis and their resultant complications  If unsuccessful use phenobarbital or propofol  • Delirium: Treat with benzodiazepines, if resistant to benzodiazepines consider treating with physostigmine  • GI decontamination maybe helpful in delayed presentation secondary to decreased GI motility provided that the patient is awake and alert  • Enhanced elimination: Procedures such as hemodialysis and repeated-dose activated charcoal are not effective in removing anticholinergic agents  • Physostigmine:   i  Pharmacology: Physostigmine is a carbamate and a reversible inhibitor of acetylcholinesterase  It increases acetylcholine concentration, causing stimulation of both muscarinic and nicotinic receptors  It also can penetrate the blood-brain barrier  It has nonspecific arousal effects  1  Onset of action: 3-8 minutes after parenteral administration  2  Duration of action: 30-90 minutes  3  Elimination half-life: 15-40 minutes  ii  Indications:  1  Severe anticholinergic syndrome from antimuscarinic agents  Generally used to reverse delirium resistant to benzodiazepines  2  Diagnostically: To differentiate functional psychosis from anticholinergic delirium   iii  Contraindications:  1   Should not be used as an antidote for cyclic antidepressant overdose because it may worsen cardiac conduction disturbance, cause bradyarrythmias or asystole, and aggravate or precipitate seizures  2  Do not use physostigmine with concurrent use of depolarizing neuromuscular blockers (e g  succinylcholine)  3  Known hypersensitivity to agent or preservative  4  Relative contraindication may include: Asthma, peripheral vascular disease, intestinal and bladder blockade, parkinsonian syndrome, and AV Block  iv  Adverse effects:  1  Bradycardia, heart block, and asystole  2  Seizure (particularly with rapid administration or excessive dose)  3  Nausea, vomiting, hypersalivation, and diarrhea  4  Bronchorrhea and bronchospasm  5  Fasciculation and muscle weakness  v  Dosage:  1  Adult: 0 5-2 mg slow IV push (£ 1 mg/min)  2  Children: 0 02 mg/kg slow IV push (£ 0 5 mg/min)  3  Repeat as needed every 10-30 minutes  4  Precautions: Patient should be on a cardiac monitor  Atropine should be kept at bedside to treat excessive muscarinic stimulation  5  Should not be administered IM or as a continuous infusion  6  It is better to undertreat than to overtreat  Physostigmine toxicity results when used in excess or in the absence of antimuscarinic toxicity  References:  Perfectoел[employer] [place]  Poisoning & Drug Overdose  2007  Julio Kim’s Toxicologic Emergencies  2006  Hx and PE limited by the dynamics of a phone consultation  I have not personally interviewed or evaluated the patient, but only advised based on the information provided to me  Primary provider is responsible for all clinical decisions  Pertinent history, physical exam and clinical findings and course discussed: Bertrand Mccormick is a 24y o  year old male who presents with AMS/seizure after overdose  No known PMH  History is limited due to patient's acute clinical condition  Reportedly, patient took 30 tabs of 50 mg diphenhydramine this morning at unknown time  Patient was brought in by EMS/police  Patient had a tonic-clonic seizure, witnessed emesis  Arrived to ED obtunded, dry mucous membranes, constricted pupils  Intubated for airway protection  Review of systems and physical exam not performed by me  Historical Information   No past medical history on file  No past surgical history on file  Social History   Social History     Substance and Sexual Activity   Alcohol Use Not on file     Social History     Substance and Sexual Activity   Drug Use Not on file     Social History     Tobacco Use   Smoking Status Not on file   Smokeless Tobacco Not on file     No family history on file  Prior to Admission medications    Not on File       Current Facility-Administered Medications   Medication Dose Route Frequency   • propofol (DIPRIVAN) 1000 mg in 100 mL infusion (premix)  5-50 mcg/kg/min Intravenous Titrated       No Known Allergies    Objective       Intake/Output Summary (Last 24 hours) at 6/21/2023 1050  Last data filed at 6/21/2023 8556  Gross per 24 hour   Intake --   Output 150 ml   Net -150 ml       Invasive Devices:   Peripheral IV 06/21/23 Right Forearm (Active)       Peripheral IV 06/21/23 Left Antecubital (Active)   Site Assessment WDL 06/21/23 1047   Dressing Type Transparent 06/21/23 1047   Line Status Flushed 06/21/23 1047       NG/OG/Enteral Tube Orogastric 16 Fr Right mouth (Active)   Placement Reverification X-ray 06/21/23 0940   Site Assessment Clean; Intact;Dry 06/21/23 0940       Urethral Catheter Temperature probe 16 Fr   (Active)   Amt returned on insertion(mL) 150 mL 06/21/23 0938   Site Assessment Clean 06/21/23 0938   Collection Container Standard drainage bag 06/21/23 0938       ETT  7 5 mm (Active)   Secured at (cm) 24 06/21/23 0931   Measured from Teeth 06/21/23 [address] 06/21/23 0931   Secured by Commercial tube mcconnell 06/21/23 0931   Site Condition Dry 06/21/23 [address]:    06/21/23 8539 06/21/23 5318 06/21/23 0947 06/21/23 1015 "  BP: 123/58 105/58  133/62   Pulse: (!) 138 (!) 126  (!) 109   Resp: (!) 23 17  15   Patient Position - Orthostatic VS: Lying Lying     Temp:   (!) 96 6 °F (35 9 °C) (!) 96 1 °F (35 6 °C)         EKG, Pathology, and/or Other Studies: I have personally reviewed pertinent reports  Sinus tachycardia 141, QRS 94, QTc 554 (likely overestimated due to tachycardia)    Lab Results: I have personally reviewed pertinent reports  Labs:    Results from last 7 days   Lab Units 06/21/23  1029   WBC Thousand/uL 3 78*   HEMOGLOBIN g/dL 13 8   HEMATOCRIT % 40 4   PLATELETS Thousands/uL 205   NEUTROS PCT % 74   LYMPHS PCT % 16   MONOS PCT % 7   EOS PCT % 1      Results from last 7 days   Lab Units 06/21/23  0947   SODIUM mmol/L 138   POTASSIUM mmol/L 4 5   CHLORIDE mmol/L 106   CO2 mmol/L 13*   BUN mg/dL 13   CREATININE mg/dL 1 26   CALCIUM mg/dL 8 3*   ALK PHOS U/L 38   ALT U/L 16   AST U/L 25              No results found for: \"TROPONINI\"      Results from last 7 days   Lab Units 06/21/23  0947   ACETAMINOPHEN LVL ug/mL <10*   ETHANOL LVL mg/dL <68   SALICYLATE LVL mg/dL <5     Invalid input(s): \"EXTPREGUR\"      Imaging Studies: I have personally reviewed pertinent reports  Counseling / Coordination of Care  Total time spent today 35 minutes  This was a phone consultation         "

## 2023-06-21 NOTE — RESPIRATORY THERAPY NOTE
RT Ventilator Management Note  Isadora Bonilla 24 y o  male MRN: [id number]  Unit/Bed#: TR 30 Encounter: [id number]      Daily Screen         6/21/2023  0931             Patient safety screen outcome[de-identified] Failed    Not Ready for Weaning due to[de-identified] Underline problem not resolved              Physical Exam:   Assessment Type: Assess only  General Appearance: Lethargic  Respiratory Pattern: Assisted  Chest Assessment: Chest expansion symmetrical  Bilateral Breath Sounds: Diminished, Clear  Suction: ET Tube      Resp Comments: pt intubated for airway protection following OD  Pt is currently on full mechanical support  skin integrity intact  ETT placement verified by cxr, chest auscaltation and end tidal       06/21/23 0931   Respiratory Assessment   Assessment Type Assess only   General Appearance Lethargic   Respiratory Pattern Assisted   Chest Assessment Chest expansion symmetrical   Bilateral Breath Sounds Diminished;Clear   Suction ET Tube   Resp Comments pt intubated for airway protection following OD  Pt is currently on full mechanical support  skin integrity intact  ETT placement verified by cxr, chest auscaltation and end tidal   Vent Information   Vent ID Saundra   Vent type Drager   Drager Vent Mode AC/VC+   $ Vent Charge-INITIAL Yes   Ventilator Start Yes   Is the patient reintubated?  No   $ Pulse Oximetry Spot Check Charge Completed   SpO2 100 %   AC/VC+ Settings   Resp Rate (BPM) 16 BPM   VT (mL) 400 mL   Insp Time (S) 1 S   FIO2 (%) 60 %   PEEP (cmH2O) 6 cmH2O   Rise Time (%) 0 2 %   Trigger Sensitivity Flow (LPM) 2 LPM   AC/VC+ Actuals   Resp Rate (BPM) 16 BPM   VT (mL) 398 mL   MV (Obs) 5 32   MAP (cmH2O) 8 5 cmH2O   Peak Pressure (cmH2O) 15 cmH2O   I:E Ratio (Obs) 1:2 8   Static Compliance (mL/cmH20) 55 mL/cmH2O   Plateau Pressure (cm H2O) 15 8 cm H2O   AC/VC+ ALARMS   High Peak Pressure (cmH2O) 45 cmH2O   High Resp Rate (BPM) 35 BPM   High MV (L/min) 11 L/min   Low MV (L/min) 3 L/min   High VT (mL) 900 mL   Maintenance   Resuscitation bag with peep valve at bedside Yes   Daily Screen   Patient safety screen outcome: Failed   Not Ready for Weaning due to:  Underline problem not resolved   IHI Ventilator Associated Pneumonia Bundle   Daily Assessment of Readiness to Extubate Yes   Head of Bed Elevated HOB Flat   ETT  7 5 mm   Placement Date/Time: 06/21/23 0930   Tube Size: 7 5 mm  Insertion attempts: 1  Placement Verification: End tidal CO2  Secured at (cm): 24 lip  Placed By: Physician   Secured at (cm) 24   Measured from [address] by Commercial tube mcconnell   Site Condition Dry   Cuff Pressure (cm H2O)   (green)

## 2023-06-21 NOTE — LETTER
[facility] UN[address]  [address]   [address] [address]  Dept: [phone]      ACUTE CARE TRANSFER CONSENT    NAME Sue Duenas                                         2000                              MRN [id number]    I have been informed of my rights regarding examination, treatment, and transfer   by Dr Ayush Mast DO    Benefits: Continuity of care    Risks: Potential for delay in receiving treatment      Consent for Transfer:  I acknowledge that my medical condition has been evaluated and explained to me by the treating physician or other qualified medical person and/or my attending physician, who has recommended that I be transferred to the service of  Accepting Physician: Nelly Reyes at [address], [address] : [place], [place]  The above potential benefits of such transfer, the potential risks associated with such transfer, and the probable risks of not being transferred have been explained to me, and I fully understand them  The doctor has explained that, in my case, the benefits of transfer outweigh the risks  I agree to be transferred  I authorize the performance of emergency medical procedures and treatments upon me in both transit and upon arrival at the receiving facility  Additionally, I authorize the release of any and all medical records to the receiving facility and request they be transported with me, if possible  I understand that the safest mode of transportation during a medical emergency is an ambulance and that the Hospital advocates the use of this mode of transport  Risks of traveling to the receiving facility by car, including absence of medical control, life sustaining equipment, such as oxygen, and medical personnel has been explained to me and I fully understand them  (SHAYY CORRECT BOX BELOW)  [ X ]  I consent to the stated transfer and to be transported by ambulance/helicopter    [  ]  I consent to the stated transfer, but refuse transportation by ambulance and accept full responsibility for my transportation by car  I understand the risks of non-ambulance transfers and I exonerate the Hospital and its staff from any deterioration in my condition that results from this refusal     X___________________________________________    DATE  23  TIME________  Signature of patient or legally responsible individual signing on patient behalf           RELATIONSHIP TO PATIENT______SELF___________________                    Provider Certification    NAME Vince Huerta                                         2000                              MRN [id number]    A medical screening exam was performed on the above named patient  Based on the examination:    Condition Necessitating Transfer ***    Patient Condition: The patient has been stabilized such that within reasonable medical probability, no material deterioration of the patient condition or the condition of the unborn child(umm) is likely to result from the transfer    Reason for Transfer: Level of Care needed not available at this facility    Transfer Requirements: [facility], [address] [place] [address] available and qualified personnel available for treatment as acknowledged by Femi Webb [phone]  Agreed to accept transfer and to provide appropriate medical treatment as acknowledged by       Clarissa Gracia  Appropriate medical records of the examination and treatment of the patient are provided at the time of transfer   [facility] [address] __IK_____  Transfer will be performed by qualified personnel from [facility]  and appropriate transfer equipment as required, including the use of necessary and appropriate life support measures      Provider Certification: I have examined the patient and explained the following risks and benefits of being transferred/refusing transfer to the patient/family: Based on these reasonable risks and benefits to the patient and/or the unborn child(umm), and based upon the information available at the time of the patient’s examination, I certify that the medical benefits reasonably to be expected from the provision of appropriate medical treatments at another medical facility outweigh the increasing risks, if any, to the individual’s medical condition, and in the case of labor to the unborn child, from effecting the transfer      X____________________________________________ DATE 06/23/23        TIME_______      ORIGINAL - SEND TO MEDICAL RECORDS   COPY - SEND WITH PATIENT DURING TRANSFER

## 2023-06-21 NOTE — RESPIRATORY THERAPY NOTE
RT Ventilator Management Note  Magdaleno Shultz 24 y o  male MRN: [id number]  Unit/Bed#:  Encounter: [id number]      Daily Screen         6/21/2023  0931             Patient safety screen outcome[de-identified] Failed    Not Ready for Weaning due to[de-identified] Underline problem not resolved              Physical Exam:   Assessment Type: Assess only  General Appearance: Sedated  Respiratory Pattern: Assisted  Chest Assessment: Chest expansion symmetrical  Bilateral Breath Sounds: Clear  Suction: ET Tube      Resp Comments: transported pt to ICU       06/21/23 1207   Respiratory Assessment   Assessment Type Assess only   General Appearance Sedated   Respiratory Pattern Assisted   Chest Assessment Chest expansion symmetrical   Bilateral Breath Sounds Clear   Resp Comments transported pt to ICU   Vent Information   Vent ID Dekcard   Vent type Drager   Drager Vent Mode AC/VC+   $ Pulse Oximetry Spot Check Charge Completed   SpO2 100 %   AC/VC+ Settings   Resp Rate (BPM) 16 BPM   VT (mL) 400 mL   Insp Time (S) 1 S   FIO2 (%) 40 %   PEEP (cmH2O) 6 cmH2O   Rise Time (%) 0 2 %   Trigger Sensitivity Flow (LPM) 2 LPM   Humidification Heater   Heater Temp 98 6 °F (37 °C)   AC/VC+ Actuals   Resp Rate (BPM) 16 BPM   VT (mL) 410 mL   MV (Obs) 5 76   MAP (cmH2O) 8 6 cmH2O   Peak Pressure (cmH2O) 16 cmH2O   I:E Ratio (Obs) 1:2 8   Static Compliance (mL/cmH20) 68 mL/cmH2O   Plateau Pressure (cm H2O) 15 1 cm H2O   Heater Temperature (Obs) 92 5 °F (33 6 °C)   AC/VC+ ALARMS   High Peak Pressure (cmH2O) 45 cmH2O   High Resp Rate (BPM) 35 BPM   High MV (L/min) 11 L/min   Low MV (L/min) 3 L/min   High VT (mL) 900 mL   Maintenance   Alarm (pink) cable attached Yes   Resuscitation bag with peep valve at bedside Yes   Water bag changed No   Circuit changed No   ETT  7 5 mm   Placement Date/Time: 06/21/23 0930   Tube Size: 7 5 mm  Insertion attempts: 1  Placement Verification: End tidal CO2  Secured at (cm): 24 lip  Placed By: Physician   Secured at (cm) 24   Measured from [address] by Commercial tube mcconnell   Site Condition Dry   Cuff Pressure (cm H2O)   (green)   HI-LO Suction  Continuous low suction   HI-LO Secretions Scant   HI-LO Intervention Patent

## 2023-06-22 LAB
ALBUMIN SERPL BCP-MCNC: 3.5 G/DL (ref 3.5–5)
ALP SERPL-CCNC: 35 U/L (ref 34–104)
ALT SERPL W P-5'-P-CCNC: 12 U/L (ref 7–52)
ANION GAP SERPL CALCULATED.3IONS-SCNC: 4 MMOL/L
AST SERPL W P-5'-P-CCNC: 17 U/L (ref 13–39)
ATRIAL RATE: 103 BPM
ATRIAL RATE: 109 BPM
ATRIAL RATE: 111 BPM
ATRIAL RATE: 88 BPM
ATRIAL RATE: 89 BPM
BASE EX.OXY STD BLDV CALC-SCNC: 75.7 % (ref 60–80)
BASE EXCESS BLDV CALC-SCNC: -1.2 MMOL/L
BILIRUB SERPL-MCNC: 1.27 MG/DL (ref 0.2–1)
BUN SERPL-MCNC: 9 MG/DL (ref 5–25)
CA-I BLD-SCNC: 1.06 MMOL/L (ref 1.12–1.32)
CALCIUM SERPL-MCNC: 7.6 MG/DL (ref 8.4–10.2)
CHLORIDE SERPL-SCNC: 107 MMOL/L (ref 96–108)
CO2 SERPL-SCNC: 25 MMOL/L (ref 21–32)
CREAT SERPL-MCNC: 1.05 MG/DL (ref 0.6–1.3)
ERYTHROCYTE [DISTWIDTH] IN BLOOD BY AUTOMATED COUNT: 12.5 % (ref 11.6–15.1)
GFR SERPL CREATININE-BSD FRML MDRD: 98 ML/MIN/1.73SQ M
GLUCOSE SERPL-MCNC: 83 MG/DL (ref 65–140)
GLUCOSE SERPL-MCNC: 84 MG/DL (ref 65–140)
HCO3 BLDV-SCNC: 23.4 MMOL/L (ref 24–30)
HCT VFR BLD AUTO: 38.6 % (ref 36.5–49.3)
HGB BLD-MCNC: 13.3 G/DL (ref 12–17)
MAGNESIUM SERPL-MCNC: 2.5 MG/DL (ref 1.9–2.7)
MCH RBC QN AUTO: 31.2 PG (ref 26.8–34.3)
MCHC RBC AUTO-ENTMCNC: 34.5 G/DL (ref 31.4–37.4)
MCV RBC AUTO: 91 FL (ref 82–98)
O2 CT BLDV-SCNC: 15 ML/DL
P AXIS: 84 DEGREES
P AXIS: 84 DEGREES
P AXIS: 85 DEGREES
PCO2 BLDV: 39.2 MM HG (ref 42–50)
PH BLDV: 7.39 [PH] (ref 7.3–7.4)
PHOSPHATE SERPL-MCNC: 2.8 MG/DL (ref 2.7–4.5)
PLATELET # BLD AUTO: 212 THOUSANDS/UL (ref 149–390)
PMV BLD AUTO: 10.2 FL (ref 8.9–12.7)
PO2 BLDV: 38.7 MM HG (ref 35–45)
POTASSIUM SERPL-SCNC: 3.8 MMOL/L (ref 3.5–5.3)
PR INTERVAL: 114 MS
PR INTERVAL: 116 MS
PR INTERVAL: 120 MS
PR INTERVAL: 120 MS
PR INTERVAL: 122 MS
PROT SERPL-MCNC: 6 G/DL (ref 6.4–8.4)
QRS AXIS: 100 DEGREES
QRS AXIS: 97 DEGREES
QRS AXIS: 97 DEGREES
QRS AXIS: 98 DEGREES
QRS AXIS: 99 DEGREES
QRSD INTERVAL: 84 MS
QRSD INTERVAL: 86 MS
QRSD INTERVAL: 86 MS
QRSD INTERVAL: 88 MS
QRSD INTERVAL: 88 MS
QT INTERVAL: 286 MS
QT INTERVAL: 310 MS
QT INTERVAL: 314 MS
QT INTERVAL: 320 MS
QT INTERVAL: 326 MS
QTC INTERVAL: 346 MS
QTC INTERVAL: 389 MS
QTC INTERVAL: 417 MS
QTC INTERVAL: 427 MS
QTC INTERVAL: 427 MS
RBC # BLD AUTO: 4.26 MILLION/UL (ref 3.88–5.62)
SODIUM SERPL-SCNC: 136 MMOL/L (ref 135–147)
T WAVE AXIS: 40 DEGREES
T WAVE AXIS: 47 DEGREES
T WAVE AXIS: 55 DEGREES
T WAVE AXIS: 72 DEGREES
T WAVE AXIS: 74 DEGREES
VENTRICULAR RATE: 103 BPM
VENTRICULAR RATE: 109 BPM
VENTRICULAR RATE: 111 BPM
VENTRICULAR RATE: 88 BPM
VENTRICULAR RATE: 89 BPM
WBC # BLD AUTO: 10.66 THOUSAND/UL (ref 4.31–10.16)

## 2023-06-22 PROCEDURE — 84100 ASSAY OF PHOSPHORUS: CPT

## 2023-06-22 PROCEDURE — 93010 ELECTROCARDIOGRAM REPORT: CPT | Performed by: INTERNAL MEDICINE

## 2023-06-22 PROCEDURE — G0425 INPT/ED TELECONSULT30: HCPCS | Performed by: PSYCHIATRY & NEUROLOGY

## 2023-06-22 PROCEDURE — 94760 N-INVAS EAR/PLS OXIMETRY 1: CPT

## 2023-06-22 PROCEDURE — 82330 ASSAY OF CALCIUM: CPT

## 2023-06-22 PROCEDURE — 83735 ASSAY OF MAGNESIUM: CPT

## 2023-06-22 PROCEDURE — 80053 COMPREHEN METABOLIC PANEL: CPT

## 2023-06-22 PROCEDURE — 82805 BLOOD GASES W/O2 SATURATION: CPT

## 2023-06-22 PROCEDURE — 82948 REAGENT STRIP/BLOOD GLUCOSE: CPT

## 2023-06-22 PROCEDURE — 99291 CRITICAL CARE FIRST HOUR: CPT

## 2023-06-22 PROCEDURE — 85027 COMPLETE CBC AUTOMATED: CPT

## 2023-06-22 RX ORDER — SODIUM CHLORIDE, SODIUM GLUCONATE, SODIUM ACETATE, POTASSIUM CHLORIDE, MAGNESIUM CHLORIDE, SODIUM PHOSPHATE, DIBASIC, AND POTASSIUM PHOSPHATE .53; .5; .37; .037; .03; .012; .00082 G/100ML; G/100ML; G/100ML; G/100ML; G/100ML; G/100ML; G/100ML
1000 INJECTION, SOLUTION INTRAVENOUS ONCE
Status: COMPLETED | OUTPATIENT
Start: 2023-06-22 | End: 2023-06-22

## 2023-06-22 RX ORDER — CALCIUM GLUCONATE 20 MG/ML
2 INJECTION, SOLUTION INTRAVENOUS ONCE
Status: COMPLETED | OUTPATIENT
Start: 2023-06-22 | End: 2023-06-22

## 2023-06-22 RX ORDER — POTASSIUM CHLORIDE 14.9 MG/ML
20 INJECTION INTRAVENOUS ONCE
Status: COMPLETED | OUTPATIENT
Start: 2023-06-22 | End: 2023-06-22

## 2023-06-22 RX ORDER — ACETAMINOPHEN 325 MG/1
650 TABLET ORAL EVERY 6 HOURS PRN
Status: DISCONTINUED | OUTPATIENT
Start: 2023-06-22 | End: 2023-06-23 | Stop reason: HOSPADM

## 2023-06-22 RX ADMIN — ACETAMINOPHEN 650 MG: 325 TABLET ORAL at 15:48

## 2023-06-22 RX ADMIN — CHLORHEXIDINE GLUCONATE 0.12% ORAL RINSE 15 ML: 1.2 LIQUID ORAL at 11:14

## 2023-06-22 RX ADMIN — MIDAZOLAM 2 MG: 1 INJECTION INTRAMUSCULAR; INTRAVENOUS at 00:06

## 2023-06-22 RX ADMIN — Medication 1 SPRAY: at 16:18

## 2023-06-22 RX ADMIN — MIDAZOLAM 2 MG: 1 INJECTION INTRAMUSCULAR; INTRAVENOUS at 06:04

## 2023-06-22 RX ADMIN — POTASSIUM CHLORIDE 20 MEQ: 14.9 INJECTION, SOLUTION INTRAVENOUS at 06:39

## 2023-06-22 RX ADMIN — SODIUM CHLORIDE, SODIUM GLUCONATE, SODIUM ACETATE, POTASSIUM CHLORIDE, MAGNESIUM CHLORIDE, SODIUM PHOSPHATE, DIBASIC, AND POTASSIUM PHOSPHATE 1000 ML: .53; .5; .37; .037; .03; .012; .00082 INJECTION, SOLUTION INTRAVENOUS at 02:27

## 2023-06-22 RX ADMIN — Medication 1 SPRAY: at 10:28

## 2023-06-22 RX ADMIN — SODIUM CHLORIDE, SODIUM GLUCONATE, SODIUM ACETATE, POTASSIUM CHLORIDE, MAGNESIUM CHLORIDE, SODIUM PHOSPHATE, DIBASIC, AND POTASSIUM PHOSPHATE 125 ML/HR: .53; .5; .37; .037; .03; .012; .00082 INJECTION, SOLUTION INTRAVENOUS at 06:46

## 2023-06-22 RX ADMIN — Medication 75 MCG/HR: at 05:00

## 2023-06-22 RX ADMIN — PROPOFOL 50 MCG/KG/MIN: 10 INJECTION, EMULSION INTRAVENOUS at 01:46

## 2023-06-22 RX ADMIN — CHLORHEXIDINE GLUCONATE 0.12% ORAL RINSE 15 ML: 1.2 LIQUID ORAL at 20:34

## 2023-06-22 RX ADMIN — CALCIUM GLUCONATE 2 G: 20 INJECTION, SOLUTION INTRAVENOUS at 06:21

## 2023-06-22 NOTE — PLAN OF CARE
Problem: Potential for Falls  Goal: Patient will remain free of falls  Description: INTERVENTIONS:  - Educate patient/family on patient safety including physical limitations  - Instruct patient to call for assistance with activity   - Consult OT/PT to assist with strengthening/mobility   - Keep Call bell within reach  - Keep bed low and locked with side rails adjusted as appropriate  - Keep care items and personal belongings within reach  - Initiate and maintain comfort rounds  - Make Fall Risk Sign visible to staff  - Apply yellow socks and bracelet for high fall risk patients  - Consider moving patient to room near nurses station  Outcome: Progressing     Problem: PAIN - ADULT  Goal: Verbalizes/displays adequate comfort level or baseline comfort level  Description: Interventions:  - Encourage patient to monitor pain and request assistance  - Assess pain using appropriate pain scale  - Administer analgesics based on type and severity of pain and evaluate response  - Implement non-pharmacological measures as appropriate and evaluate response  - Consider cultural and social influences on pain and pain management  - Notify physician/advanced practitioner if interventions unsuccessful or patient reports new pain  Outcome: Progressing     Problem: INFECTION - ADULT  Goal: Absence or prevention of progression during hospitalization  Description: INTERVENTIONS:  - Assess and monitor for signs and symptoms of infection  - Monitor lab/diagnostic results  - Monitor all insertion sites, i e  indwelling lines, tubes, and drains  - Monitor endotracheal if appropriate and nasal secretions for changes in amount and color  - [place] appropriate cooling/warming therapies per order  - Administer medications as ordered  - Instruct and encourage patient and family to use good hand hygiene technique  - Identify and instruct in appropriate isolation precautions for identified infection/condition  Outcome: Progressing  Goal: Absence of fever/infection during neutropenic period  Description: INTERVENTIONS:  - Monitor WBC    Outcome: Progressing     Problem: SAFETY ADULT  Goal: Patient will remain free of falls  Description: INTERVENTIONS:  - Educate patient/family on patient safety including physical limitations  - Instruct patient to call for assistance with activity   - Consult OT/PT to assist with strengthening/mobility   - Keep Call bell within reach  - Keep bed low and locked with side rails adjusted as appropriate  - Keep care items and personal belongings within reach  - Initiate and maintain comfort rounds  - Make Fall Risk Sign visible to staff  - Apply yellow socks and bracelet for high fall risk patients  - Consider moving patient to room near nurses station  Outcome: Progressing  Goal: Maintain or return to baseline ADL function  Description: INTERVENTIONS:  -  Assess patient's ability to carry out ADLs; assess patient's baseline for ADL function and identify physical deficits which impact ability to perform ADLs (bathing, care of mouth/teeth, toileting, grooming, dressing, etc )  - Assess/evaluate cause of self-care deficits   - Assess range of motion  - Assess patient's mobility; develop plan if impaired  - Assess patient's need for assistive devices and provide as appropriate  - Encourage maximum independence but intervene and supervise when necessary  - Involve family in performance of ADLs  - Assess for home care needs following discharge   - Consider OT consult to assist with ADL evaluation and planning for discharge  - Provide patient education as appropriate  Outcome: Progressing  Goal: Maintains/Returns to pre admission functional level  Description: INTERVENTIONS:  - Perform BMAT or MOVE assessment daily    - Set and communicate daily mobility goal to care team and patient/family/caregiver     - Collaborate with rehabilitation services on mobility goals if consulted  - Out of bed for toileting  - Record patient progress and toleration of activity level   Outcome: Progressing     Problem: DISCHARGE PLANNING  Goal: Discharge to home or other facility with appropriate resources  Description: INTERVENTIONS:  - Identify barriers to discharge w/patient and caregiver  - Arrange for needed discharge resources and transportation as appropriate  - Identify discharge learning needs (meds, wound care, etc )  - Arrange for interpretive services to assist at discharge as needed  - Refer to Case Management Department for coordinating discharge planning if the patient needs post-hospital services based on physician/advanced practitioner order or complex needs related to functional status, cognitive ability, or social support system  Outcome: Progressing     Problem: Knowledge Deficit  Goal: Patient/family/caregiver demonstrates understanding of disease process, treatment plan, medications, and discharge instructions  Description: Complete learning assessment and assess knowledge base    Interventions:  - Provide teaching at level of understanding  - Provide teaching via preferred learning methods  Outcome: Progressing     Problem: MOBILITY - ADULT  Goal: Maintain or return to baseline ADL function  Description: INTERVENTIONS:  -  Assess patient's ability to carry out ADLs; assess patient's baseline for ADL function and identify physical deficits which impact ability to perform ADLs (bathing, care of mouth/teeth, toileting, grooming, dressing, etc )  - Assess/evaluate cause of self-care deficits   - Assess range of motion  - Assess patient's mobility; develop plan if impaired  - Assess patient's need for assistive devices and provide as appropriate  - Encourage maximum independence but intervene and supervise when necessary  - Involve family in performance of ADLs  - Assess for home care needs following discharge   - Consider OT consult to assist with ADL evaluation and planning for discharge  - Provide patient education as appropriate  Outcome: Progressing  Goal: Maintains/Returns to pre admission functional level  Description: INTERVENTIONS:  - Perform BMAT or MOVE assessment daily    - Set and communicate daily mobility goal to care team and patient/family/caregiver  - Collaborate with rehabilitation services on mobility goals if consulted  - Out of bed for toileting  - Record patient progress and toleration of activity level   Outcome: Progressing     Problem: Prexisting or High Potential for Compromised Skin Integrity  Goal: Skin integrity is maintained or improved  Description: INTERVENTIONS:  - Identify patients at risk for skin breakdown  - Assess and monitor skin integrity  - Assess and monitor nutrition and hydration status  - Monitor labs   - Assess for incontinence   - Turn and reposition patient  - Assist with mobility/ambulation  - Relieve pressure over bony prominences  - Avoid friction and shearing  - Provide appropriate hygiene as needed including keeping skin clean and dry  - Evaluate need for skin moisturizer/barrier cream  - Collaborate with interdisciplinary team   - Patient/family teaching  - Consider wound care consult   Outcome: Progressing     Problem: Nutrition/Hydration-ADULT  Goal: Nutrient/Hydration intake appropriate for improving, restoring or maintaining nutritional needs  Description: Monitor and assess patient's nutrition/hydration status for malnutrition  Collaborate with interdisciplinary team and initiate plan and interventions as ordered  Monitor patient's weight and dietary intake as ordered or per policy  Utilize nutrition screening tool and intervene as necessary  Determine patient's food preferences and provide high-protein, high-caloric foods as appropriate       INTERVENTIONS:  - Monitor oral intake, urinary output, labs, and treatment plans  - Assess nutrition and hydration status and recommend course of action  - Evaluate amount of meals eaten  - Assist patient with eating if necessary   - Allow adequate time for meals  - Recommend/ encourage appropriate diets, oral nutritional supplements, and vitamin/mineral supplements  - Order, calculate, and assess calorie counts as needed  - Recommend, monitor, and adjust tube feedings and TPN/PPN based on assessed needs  - Assess need for intravenous fluids  - Provide specific nutrition/hydration education as appropriate  - Include patient/family/caregiver in decisions related to nutrition  Outcome: Progressing

## 2023-06-22 NOTE — RESPIRATORY THERAPY NOTE
RT Ventilator Management Note  Bebo Diaz 24 y o  male MRN: [id number]  Unit/Bed#:  Encounter: [id number]      Daily Screen       6/21/2023  0931 6/21/2023 2121          Patient safety screen outcome[de-identified] Failed Failed      Not Ready for Weaning due to[de-identified] Underline problem not resolved Underline problem not resolved              Physical Exam:   Assessment Type: Assess only  General Appearance: Drowsy, Eyes open/responds to stimulus  Respiratory Pattern: Assisted  Chest Assessment: Chest expansion symmetrical  Bilateral Breath Sounds: Clear  Cough: None  Suction: Oral  O2 Device: mechanica; ventilation  Subjective Data: sedated      Resp Comments: patient remains intubated and sedated s/p being admirtted for drug overdose requiring inbtubation and [facility] oxygenation b s  clear and eqaukl no advertes breath sounds noted pateitn resting wihtout apparent respiratory distress pateitn remains sedated on full mechanical ventilation   will attempt wean of sedation and SBT as tolerated in the am

## 2023-06-22 NOTE — RESPIRATORY THERAPY NOTE
RT Ventilator Management Note  Sue Duenas 24 y o  male MRN: [id number]  Unit/Bed#:  Encounter: [id number]      Daily Screen         6/21/2023 2121 6/22/2023  0739          Patient safety screen outcome[de-identified] Failed Passed      Not Ready for Weaning due to[de-identified] Underline problem not resolved --                Physical Exam:   Assessment Type: Assess only  General Appearance: Awake, Alert  Respiratory Pattern: Assisted  Chest Assessment: Chest expansion symmetrical  Bilateral Breath Sounds: Clear  Cough: None  Suction: Oral  O2 Device: mechanica; ventilation  Subjective Data: sedated      Resp Comments: placed pt on cpap/ps       06/22/23 0739   Respiratory Assessment   Assessment Type Assess only   General Appearance Awake; Alert   Respiratory Pattern Assisted   Resp Comments placed pt on cpap/ps   Vent Information   Vent ID Saundra   Vent type Drager   Drager Vent Mode CPAP/PS Spont   $ Pulse Oximetry Spot Check Charge Completed   SpO2 100 %   CPAP/PS Spont Settings   FIO2 (%) 40 %   PEEP (cmH2O) 6 cmH2O   Pressure Support (cmH2O) 6 cmH20   Rise Time (%) 0 2 %   Humidification Heater   Heater Temp 98 6 °F (37 °C)   CPAP/PS Spont Actuals   Resp Rate (BPM) 13 BPM   VT (mL) 519 mL   MV (Obs) 4 7   MAP (cmH2O) 8 5 cmH2O   Peak Pressure (cmH2O) 12 cmH2O   RSBI 42   Heater Temperature (Obs) 98 6 °F (37 °C)   CPAP/PS Spont Alarms   High Peak Pressure (cmH20) 31 cmH2O   High Resp Rate (BPM) 33 BPM   High MV (L/min) 10 L/min   Low MV (L/min) 3 L/min   High Karla VTE (mL) 900 mL   Low Karla VTE (mL) 220 mL   High SPONT VTE (mL) 99 mL   Low Spont VTE (mL) 220 mL   Maintenance   Alarm (pink) cable attached Yes   Resuscitation bag with peep valve at bedside Yes   Water bag changed No   Circuit changed No   Daily Screen   Patient safety screen outcome: Passed   IHI Ventilator Associated Pneumonia Bundle   Daily Awakening Trials Performed Yes   Daily Assessment of Readiness to Extubate Yes   ETT  7 mm   Placement Date/Time: 06/21/23 0930   Tube Size: 7 mm  Insertion attempts: 1  Placement Verification: End tidal CO2  Secured at (cm): 24 lip  Placed By: Physician   Secured at (cm) 24   Measured from Teeth   Secured Location Left   Secured by Commercial tube [email or web address]

## 2023-06-22 NOTE — CASE MANAGEMENT
Case Management Assessment & Discharge Planning Note    Patient name Chaya Francisco  Location / MRN [id number]  : 2000 Date 2023       Current Admission Date: 2023  Current Admission Diagnosis:Anticholinergic drug overdose   Patient Active Problem List    Diagnosis Date Noted   • Anticholinergic drug overdose 2023   • Prolonged Q-T interval on ECG 2023   • Acute respiratory failure ([address] )    • Metabolic acidosis       LOS (days): 1  Geometric Mean LOS (GMLOS) (days):   Days to GMLOS:     OBJECTIVE:    Risk of Unplanned Readmission Score: 10 29         Current admission status: Inpatient       Preferred Pharmacy:   [facility]  [address]  Phone: [phone] Fax: [phone]    Primary Care Provider: No primary care provider on file  Primary Insurance: ALBERTO KEITA PENDING  Secondary Insurance:     ASSESSMENT:  Active Health Care Proxies    There are no active Health Care Proxies on file  DISCHARGE DETAILS:    Discharge planning discussed with[de-identified] patient and wife - Rosemary Saini of Choice: Yes  Comments - Freedom of Choice: CM reviewed chart and conferred with ICU Nursing  CM alerted Financial Services,Larry Stoddard  via e-mail, earlier in the day ref: need for MA steve as patient is a [address], who resides in [facility] states he spoke with patient and application is now with [facility]  CM met with patient after  [facility] eval- recommendation:Upon medical clearance, inpatient psychiatric treatment voluntarily, and if not voluntarily then involuntarily, is indicated for provision of precautions, further diagnostic evaluation and treatment stabilization  Patient staes he understands and presntly is planning on  voluntary treatment and will discuss optuions with Crisis SW    Patient staed that he did not want to discuss any Psycho/Social hx in detail at this time  Marisa Garcia stated she is his legal wife,from [place], presently  lives in [facility] ,but will stay [place] the street from hospital in a motel ,until he is discharged  they have a 3 yo son , who is presently under the care of family/friends  CM contacted family/caregiver?: Yes  Were Treatment Team discharge recommendations reviewed with patient/caregiver?: Yes  Did patient/caregiver verbalize understanding of patient care needs?: Yes  Were patient/caregiver advised of the risks associated with not following Treatment Team discharge recommendations?: Yes    Contacts  Patient Contacts: Mary Guy- Wife -[phone]  Relationship to Patient[de-identified] Family  Contact Method:  In Person  Reason/Outcome: Continuity of [address]         Is the patient interested in [facility] at discharge?: No    DME Referral Provided  Referral made for DME?: No    Other Referral/Resources/Interventions Provided:  Interventions: Psych Rehab  Referral Comments: Patient will work with Crisis Team/SW on in-patient behavioral health option 201 vs 302         Treatment Team Recommendation: Inpatient Ting Thakkar

## 2023-06-22 NOTE — PROGRESS NOTES
"[facility]  Progress Note  Name: Macey Maguire  MRN: [id number]  Unit/Bed#:  I Date of Admission: 6/21/2023   Date of Service: 6/22/2023 I Hospital Day: 1    Assessment/Plan   * Anticholinergic drug overdose  Assessment & Plan  · Patient alert on EMS arrival and reports taking thirty Benadryl 50 mg tablets to \"take a break\"  · Denies suicide attempt/ideation  · Evidence of self harm with cuts on his left arm  · Tonic clonic seizure en route to the hospital   · Exam consistent with anticholinergic OD  · Toxicology consulted, appreciate recommendations  · Nystagmus shortly after arrival to the ICU, given lorazepam 1 mg IVP  · Seizure precautions  · Plan for 1:1 continual observation and psych consult on extubation  · PRN versed  · PRN Tylenol  · Continue IVF    Acute respiratory failure ([address] )  Assessment & Plan  · Intubated in the ED for airway protection  · Continue lung protective mechanical ventilation  · Monitor VBG  · Plan for SBT/SAT in the morning  · Propofol for sedation   · Fentanyl for pain    Metabolic acidosis  Assessment & Plan  · CO2 13 on chemistry and VBG showing mixed metabolic/respiratory acidosis  · IV hydration  · CO2 improved on repeat labs    Prolonged Q-T interval on ECG  Assessment & Plan  · Initial QTc 554  · Repeat 427  · Per toxicology note give mag 2 g IV if remains >500, appears to be resolved now             ICU Core Measures     Vented Patient  VAP Bundle  VAP bundle ordered     A: Assess, Prevent, and Manage Pain · Has pain been assessed? Yes  · Need for changes to pain regimen? No   B: Both Spontaneous Awakening Trials (SATs) and Spontaneous Breathing Trials (SBTs) · Plan to perform spontaneous awakening trial today? Yes   · Plan to perform spontaneous breathing trial today? Yes   · Obvious barriers to extubation? No   C: Choice of Sedation · RASS Goal: -2 Light Sedation  · Need for changes to sedation or analgesia regimen?  No   D: Delirium " · CAM-ICU: Positive   E: Early Mobility  · Plan for early mobility? Yes   F: Family Engagement · Plan for family engagement today? Yes       Review of Invasive Devices: Karina Plan: Continue for accurate I/O monitoring for 48 hours        Prophylaxis:  VTE VTE covered by:  enoxaparin, Subcutaneous, 40 mg at 06/21/23 1243       Stress Ulcer  covered byfamotidine (PEPCID) oral suspension 20 mg [[id number]]     Subjective   HPI/24hr events:   Isolyte bolus x2 for low grade temperature  Fentanyl and Versed added    Review of Systems   Unable to perform ROS: Intubated            Objective                            Vitals I/O      Most Recent Min/Max in 24hrs   Temp 99 5 °F (37 5 °C) Temp  Min: 96 1 °F (35 6 °C)  Max: 100 9 °F (38 3 °C)   Pulse 82 Pulse  Min: 76  Max: 138   Resp 14 Resp  Min: 14  Max: 23   /65 BP  Min: 105/61  Max: 133/62   O2 Sat 100 % SpO2  Min: 99 %  Max: 100 %      Intake/Output Summary (Last 24 hours) at 6/22/2023 0301  Last data filed at 6/22/2023 0001  Gross per 24 hour   Intake 1305 84 ml   Output 1715 ml   Net -409 16 ml         Diet NPO     Invasive Monitoring Physical exam    Physical Exam  Constitutional:       General: He is not in acute distress  Appearance: Normal appearance  HENT:      Head: Normocephalic  Mouth/Throat:      Mouth: Mucous membranes are moist       Pharynx: Oropharynx is clear  Cardiovascular:      Rate and Rhythm: Normal rate and regular rhythm  Pulses: Normal pulses  Heart sounds: Normal heart sounds  Pulmonary:      Effort: Pulmonary effort is normal       Breath sounds: Normal breath sounds  Abdominal:      General: Bowel sounds are normal  There is no distension  Palpations: Abdomen is soft  Skin:     General: Skin is warm and dry  Capillary Refill: Capillary refill takes less than 2 seconds  Diagnostic Studies    EKG: NSR  Imaging:  I have personally reviewed pertinent reports         Medications:  Scheduled PRN chlorhexidine, 15 mL, Q12H CORRINA  enoxaparin, 40 mg, Daily  famotidine, 20 mg, BID  multi-electrolyte, 1,000 mL, Once      acetaminophen, 650 mg, Q4H PRN  midazolam, 2 mg, Q4H PRN       Continuous    fentaNYL, 75 mcg/hr, Last Rate: 75 mcg/hr (06/21/23 2141)  multi-electrolyte, 125 mL/hr, Last Rate: 125 mL/hr (06/21/23 2142)  propofol, 5-50 mcg/kg/min, Last Rate: 50 mcg/kg/min (06/22/23 0146)         Labs:    CBC    Recent Labs     06/21/23  1029 06/21/23  1236   WBC 3 78*  --    HGB 13 8  --    HCT 40 4  --     210     BMP    Recent Labs     06/21/23  0947 06/21/23  1738   SODIUM 138 136   K 4 5 3 5    107   CO2 13* 23   AGAP 19 6   BUN 13 10   CREATININE 1 26 1 13   CALCIUM 8 3* 8 2*       Coags    No recent results     Additional Electrolytes  Recent Labs     06/21/23  1738   MG 2 3          Blood Gas    No recent results  Recent Labs     06/21/23  1738   PHVEN 7 450*   LTV1SQI 33 9*   PO2VEN 61 2*   YXD7XPP 23 0*   BEVEN -0 3    LFTs  Recent Labs     06/21/23  0947   ALT 16   AST 25   ALKPHOS 38   ALB 4 4   TBILI 0 73       Infectious  No recent results  Glucose  Recent Labs     06/21/23  0947 06/21/23  1738   GLUC 132 98               Critical Care Time Delivered: Upon my evaluation, this patient had a high probability of imminent or life-threatening deterioration due to Overdose, which required my direct attention, intervention, and personal management  I have personally provided 30 minutes of critical care time, exclusive of procedures, teaching, family meetings, and any prior time recorded by providers other than myself       Jerrod Santiago

## 2023-06-22 NOTE — TELEMEDICINE
TeleConsultation - [address] 24 y o  male MRN: [id number]  Unit/Bed#:  Encounter: [id number]        REQUIRED DOCUMENTATION:     1  This service was provided via Telemedicine  2  Provider located at [facility]   3  TeleMed provider: Aleisha Henry MD   4  Identify all parties in room with patient during tele consult:  pt  5  Patient was then informed that this was a Telemedicine visit and that the exam was being conducted confidentially over secure lines  My office door was closed  No one else was in the room  Patient acknowledged consent and understanding of privacy and security of the Telemedicine visit, and gave us permission to have the assistant stay in the room in order to assist with the history and to conduct the exam   I informed the patient that I have reviewed their record in Epic and presented the opportunity for them to ask any questions regarding the visit today  The patient agreed to participate  Assessment/Plan     Present on Admission:  **None**    Assessment:    Unspecified mood disorder; rule out major depression severe without psychotic features    Treatment Plan:    Upon medical clearance, inpatient psychiatric treatment voluntarily, and if not voluntarily then involuntarily, is indicated for provision of precautions, further diagnostic evaluation and treatment stabilization  Continual observation suicide precautions are indicated  We will defer discussion of psychiatric medication options to the inpatient psychiatrist given the recent overdose  Reconsult psychiatry as needed          Current Medications:     Current Facility-Administered Medications   Medication Dose Route Frequency Provider Last Rate   • acetaminophen  650 mg Oral Q6H PRN JOSELITO Nagy     • chlorhexidine  15 mL Mouth/Throat Q12H [address] JOSELITO Nagy     • enoxaparin  40 mg Subcutaneous Daily JOSELITO Nagy     • phenol  1 spray Mouth/Throat Q2H PRN JOSELITO Hand         Risks / Benefits of Treatment:    Risks, benefits, and possible side effects of medications explained to patient and patient verbalizes understanding  Other treatment modalities recommended as indicated:    · Inpatient psychiatric treatment      Inpatient consult to Psychiatry  Consult performed by: Bon Acevedo MD  Consult ordered by: Fatemeh Cross, [address] [place] [address]        Physician Requesting Consult: Juju Reardon MD  Principal Problem:Anticholinergic drug overdose    Reason for Consult: Intentional overdose      History of Present Illness      Patient is a 24 y o  male who presented to the hospital where the physician documented the following:  Overdose - Accidental        Pt arrived via EMS after taking 30 50 mg benadryl to take a break , pt has lacerations on his right arm upon arrival  Pt was given Zofran in transport  EMS reports seizure activity during transport       HPI patient is a 54-year-old male, arrives via EMS reports patient took 30 tablets of 50 mg of Benadryl this morning to take a break  He denied a suicide attempt for them  Patient apparently was ambulatory according to police and then verbal according to EMS and then had a tonic-clonic terminal seizure followed by some vomiting  Arrives here obtunded  Appears to move his upper extremities spontaneously  Patient some eye movements looking around, pupils are constricted  His membranes were dry  Was unable to give a history  Apparently found with his girlfriend but his wife called from [place]  Because patient was unresponsive unable to control his airway he was intubated on arrival   Past medical history unknown  Family history unknown  Social history unknown        None         Medical History[]Expand by Default   No past medical history on file         Surgical History[]Expand by Default   No past surgical history on file         Family History[]Expand by Default   No family history on file  "    I have reviewed and agree with the history as documented      No existing history information found  No existing history information found      Review of Systems   Unable to perform ROS: Intubated     Nursing reports that following extubation, the patient has admitted that the overdose was intentional to harm himself  In meeting with the patient he admits he has been having suicidal ideations off and on for a couple of years  He states he had onset of his suicidal ideation again apparently following an argument with his wife and he immediately decided on overdosing on the Benadryl in an attempt to accomplish suicide  Past psychiatric history: No prior treatment    Social history: Patient is   He admits to conflicts with his wife \"just like everybody else\"  He has a 3year-old child  He is not working [address] on disability  He is not a student  Family history: Unremarkable    Substance use history: The patient states he may have occasional alcohol socially drinking 2 beers at most   He denies other substance use  Mental status examination: Patient is alert and well oriented in all spheres  Affect is depressed  He appears psychomotor retarded  Speech is somewhat monotone otherwise unremarkable  Responses to questions were very brief  Sensorium is clear  Thought process was logical and linear  Thought content was reality based  Associations tight  Memory was intact in all spheres  He appears to be of average intelligence by his use of vocabulary, general fund of knowledge, sentence structure and syntax  He admits to the suicidal ideation with overdose with intent to complete suicide  He denies homicidal ideation  He denies hallucinations and other psychotic features  He has demonstrated impaired insight and judgment  History reviewed  No pertinent past medical history      Medical Review Of Systems:    Review of Systems    Meds/Allergies     all current active meds have been " reviewed  No Known Allergies    Objective     Vital signs in last 24 hours:  Temp:  [98 1 °F (36 7 °C)-100 9 °F (38 3 °C)] 100 9 °F (38 3 °C)  HR:  [] 96  Resp:  [12-18] 12  BP: (101-126)/(56-75) 123/71  FiO2 (%):  [39 9-40] 40      Intake/Output Summary (Last 24 hours) at 6/22/2023 1357  Last data filed at 6/22/2023 1000  Gross per 24 hour   Intake 4941 ml   Output 3865 ml   Net 1076 ml     Lab Results: I have personally reviewed all pertinent laboratory/tests results  Imaging Studies: CT head without contrast    Result Date: 6/21/2023  Narrative: CT BRAIN - WITHOUT CONTRAST INDICATION:   Unresponsive intubated overdose known injury  COMPARISON:  None  TECHNIQUE:  CT examination of the brain was performed  Multiplanar 2D reformatted images were created from the source data  Radiation dose length product (DLP) for this visit:  837 mGy-cm   This examination, like all CT scans performed in the [facility], was performed utilizing techniques to minimize radiation dose exposure, including the use of iterative reconstruction and automated exposure control  IMAGE QUALITY:  Diagnostic  FINDINGS: PARENCHYMA:  No intracranial mass, mass effect or midline shift  No CT signs of acute infarction  No acute parenchymal hemorrhage  VENTRICLES AND EXTRA-AXIAL SPACES:  Normal for the patient's age  VISUALIZED ORBITS: Normal visualized orbits  PARANASAL SINUSES: Normal visualized paranasal sinuses  CALVARIUM AND EXTRACRANIAL SOFT TISSUES:  Normal      Impression: No acute intracranial abnormality  Workstation performed: [id number]     CT chest abdomen pelvis w contrast    Result Date: 6/21/2023  Narrative: CT CHEST, ABDOMEN, AND PELVIS WITH IV CONTRAST INDICATION:   Overdose unresponsive intubated  COMPARISON:  None  TECHNIQUE: CT examination of the chest, abdomen, and pelvis was performed   Axial, sagittal, and coronal 2D reformatted images were created from the source data and submitted for interpretation  3D reconstructions of the bony thorax were performed in order to improved sensitivity of evaluation for rib fractures  Radiation dose length product (DLP) for this visit:  666 mGy-cm   This examination, like all CT scans performed in the [facility], was performed utilizing techniques to minimize radiation dose exposure, including the use of iterative reconstruction and automated exposure control  IV Contrast:  100 mL of iohexol (OMNIPAQUE) Enteric Contrast: None  FINDINGS: CHEST: LARGE AIRWAYS: Endotracheal tube terminates 3 cm above the josue  LUNGS: No consolidation  No edema  No suspicious mass or nodule  PLEURA: No pneumothorax  No pleural effusion  HEART: Normal cardiac size and morphology  No coronary artery calcification  No pericardial effusion or thickening  VESSELS: Normal caliber thoracic aorta with no detectable atherosclerotic plaque  Normal caliber main pulmonary artery  No filling defects to suggest pulmonary embolism in the main or lobar pulmonary arteries  MEDIASTINUM AND ION: Within normal limits  No lymphadenopathy  CHEST WALL AND LOWER NECK:   Within normal limits  ABDOMEN: LIVER: Normal size and morphology  No suspicious lesion  Focal fat adjacent to the falciform ligament  BILIARY: No intrahepatic biliary ductal dilatation  Normal caliber common bile duct  GALLBLADDER: No calcified gallstones  Normal wall thickness  No pericholecystic inflammatory changes  SPLEEN: Within normal limits  No suspicious lesion  Normal spleen size  PANCREAS: Pancreatic parenchyma is within normal limits  No main pancreatic ductal dilatation  No pancreatic/peripancreatic inflammation  ADRENAL GLANDS: Within normal limits  KIDNEYS/URETERS: Normal kidney size and position with symmetric enhancement  No suspicious lesions  No hydronephrosis  Evaluation for tiny stones limited by contrast excreted into the collecting systems  Normal ureters   STOMACH AND BOWEL: The stomach is decompressed with NG tube in place  Normal caliber small bowel  Normal caliber large bowel  No evidence of active small or large bowel inflammatory process  APPENDIX: No findings to suggest acute appendicitis  ABDOMINOPELVIC CAVITY: No ascites  No intraperitoneal free air  No lymphadenopathy  No retroperitoneal hematoma  VESSELS: Normal caliber abdominal aorta with no detectable atherosclerotic plaque  The celiac, SMA, and [place] are patent  The SMV and splenic vein are patent  The portal veins are patent  The hepatic veins are patent but dilated presumably reflecting volume status  PELVIS REPRODUCTIVE ORGANS: Normal prostate  Symmetric seminal vesicles  URINARY BLADDER: The bladder is moderately distended, Collins catheter in place with associated air  No calculi  ABDOMINAL WALL/INGUINAL REGIONS: Small fat-containing umbilical hernia  BONES: Vertebral body height is maintained  No acute fracture or destructive osseous lesion  Impression: No acute findings in the chest, abdomen, or pelvis  Workstation performed: [id number]     XR chest 1 view portable    Result Date: 6/21/2023  Narrative: CHEST INDICATION:   Overdose intubated  COMPARISON:  None EXAM PERFORMED/VIEWS:  XR CHEST PORTABLE FINDINGS: ETT 4 5 cm above josue  NG tube tip beneath diaphragm but not seen due to positioning  Cardiomediastinal silhouette appears unremarkable  The lungs are clear  No pneumothorax or pleural effusion  Osseous structures appear within normal limits for patient age  Impression: ETT and NGT in place No acute cardiopulmonary disease   Workstation performed: [id number]     EKG/Pathology/Other Studies:   Lab Results   Component Value Date    VENTRATE 89 06/21/2023    ATRIALRATE 89 06/21/2023    PRINT 122 06/21/2023    QRSDINT 86 06/21/2023    QTINT 320 06/21/2023    QTCINT 389 06/21/2023    PAXIS 85 06/21/2023    QRSAXIS 97 06/21/2023    TWAVEAXIS 74 06/21/2023        Code Status: Level 1 - Full Code  Advance Directive and Living Will:      Power of :    POLST:      Screenings:    1  Nutrition Screening  · Not available on chart    2  Pain Screening  Not available on chart    3  Suicide Screening  Not available on chart    Counseling / Coordination of Care: Total floor / unit time spent today 30 minutes  Greater than 50% of total time was spent with the patient and / or family counseling and / or coordination of care  A description of the counseling / coordination of care: Chart review, patient evaluation, coordination and communication with staff, nursing and provider

## 2023-06-22 NOTE — UTILIZATION REVIEW
Initial Clinical Review    Admission: Date/Time/Statement:   Admission Orders (From admission, onward)     Ordered        06/21/23 1038  INPATIENT ADMISSION  Once                      Orders Placed This Encounter   Procedures   • INPATIENT ADMISSION     Standing Status:   Standing     Number of Occurrences:   1     Order Specific Question:   Level of Care     Answer:   Critical Care [15]     Order Specific Question:   Estimated length of stay     Answer:   More than 2 Midnights     Order Specific Question:   Certification     Answer:   I certify that inpatient services are medically necessary for this patient for a duration of greater than two midnights  See H&P and MD Progress Notes for additional information about the patient's course of treatment  ED Arrival Information     Expected   -    Arrival   6/21/2023 09:25    Acuity   Immediate            Means of arrival   Ambulance    Escorted by   [address] [facility]/ICU    Admission type   Emergency            Arrival complaint   OVERDOSE           Chief Complaint   Patient presents with   • Overdose - Accidental     Pt arrived via EMS after taking 30 50 mg benadryl to take a break , pt has lacerations on his right arm upon arrival  Pt was given Zofran in transport  EMS reports seizure activity during transport  Initial Presentation: 24 y o  male to the ED from home via EMS with complaints of overdose, took 30-50 mg benadryl tablets  ON EMS arrival, he was ambulatory, then had tonic clonic seizure, vomiting, arrives to the ED obtunded  INtubated upon arrival to the ED  She arrives unresponsive, tachypneic, tachycardic, nonverbal, looking around occasionally  Superficial lacerations indicating self cutting on left arm  Anion gap 19  CT head shows no abnormality  Started on aggressive iv fluids  Episode of nystagmus on arrival to the ED for which he was given iv ativan       Med/tox:QTc was also noted to be 554, but this appears to be an overestimation  Monitor closely and if QTc remains > 500 and appears to be correct  Give IV mag over an hour  Supportive treatment with iV fluids  Monitor acidemia  Monitor for seizures         ED Triage Vitals   Temperature Pulse Respirations Blood Pressure SpO2   06/21/23 0947 06/21/23 0927 06/21/23 0927 06/21/23 0927 06/21/23 0927   (!) 96 6 °F (35 9 °C) (!) 138 (!) 23 123/58 100 %      Temp Source Heart Rate Source Patient Position - Orthostatic VS BP Location FiO2 (%)   06/21/23 1300 06/21/23 1015 06/21/23 0927 06/21/23 0927 06/21/23 1300   Bladder Monitor Lying Right arm 40      Pain Score       06/21/23 1631       Med Not Given for Pain - for MAR use only          Wt Readings from Last 1 Encounters:   06/22/23 55 8 kg (123 lb 0 3 oz)     Additional Vital Signs:   Date/Time Temp Pulse Resp BP MAP (mmHg) SpO2 FiO2 (%) Calculated FIO2 (%) - Nasal Cannula Nasal Cannula O2 Flow Rate (L/min) O2 Device Patient Position - Orthostatic VS   06/22/23 1000 100 2 °F (37 9 °C) 105 -- 116/66 87 100 % -- -- -- -- --   06/22/23 0400 98 1 °F (36 7 °C) 73 14 103/57 73 100 % 40 -- -- -- --   06/22/23 0300 98 6 °F (37 °C) 73 14 101/56 72 100 % 40 -- -- -- --   06/22/23 0200 99 5 °F (37 5 °C) 82 -- 109/65 83 100 % 40 -- -- -- --   06/21/23 1900 100 9 °F (38 3 °C) Abnormal  99 18 112/65 83 99 % 40 -- -- Ventilator --   06/21/23 1800 100 9 °F (38 3 °C) Abnormal  94 18 120/75 90 100 % 40 -- -- Ventilator --   06/21/23 1700 100 8 °F (38 2 °C) Abnormal  98 18 114/62 83 100 % 40 -- -- Ventilator Lying   06/21/23 1600 100 6 °F (38 1 °C) Abnormal   99 18 110/64 81 100 % 40 -- -- Ventilator Lying   Temp: JOSELITO Baig aware; tylenol ordered and given at 06/21/23 1600   06/21/23 1534 -- -- -- -- -- 100 % -- -- -- -- --   06/21/23 1500 100 4 °F (38 °C) 105 18 109/62 80 100 % 40 -- -- Ventilator Lying   06/21/23 1436 -- 104 -- -- -- -- -- -- -- -- --   06/21/23 1400 100 °F (37 8 °C) 110 Abnormal  18 124/67 88 100 % 40 -- -- Ventilator --   06/21/23 1100 96 3 °F (35 7 °C) Abnormal  104 15 122/60 85 100 % -- -- -- -- --   06/21/23 1015 96 1 °F (35 6 °C) Abnormal  109 Abnormal  15 133/62 93 100 % -- -- -- Ventilator --   06/21/23 0947 96 6 °F (35 9 °C) Abnormal  -- -- -- -- -- -- -- -- -- --   06/21/23 0942 -- 126 Abnormal  17 105/58 -- 100 % -- -- -- Ventilator Lying   06/21/23 0931 -- -- -- -- -- 100 % -- -- -- -- --   06/21/23 0927 -- 138 Abnormal  23 Abnormal  123/58 -- 100 % -- -- -- Non-rebreather         Pertinent Labs/Diagnostic Test Results:   6/22 EKG: Sinus tachycardia  Biatrial enlargement  Rightward axis  Nonspecific T wave abnormality  Abnormal ECG  CT head without contrast   Final Result by Haja Pruitt MD (06/21 1044)      No acute intracranial abnormality  Workstation performed: [id number]         CT chest abdomen pelvis w contrast   Final Result by Suzette Piña MD (06/21 1042)      No acute findings in the chest, abdomen, or pelvis  Workstation performed: [id number]         XR chest 1 view portable   Final Result by Marlon Person MD (06/21 1001)      ETT and NGT in place      No acute cardiopulmonary disease                    Workstation performed: [id number]               Results from last 7 days   Lab Units 06/22/23  0453 06/21/23  1236 06/21/23  1029   WBC Thousand/uL 10 66*  --  3 78*   HEMOGLOBIN g/dL 13 3  --  13 8   HEMATOCRIT % 38 6  --  40 4   PLATELETS Thousands/uL 212 210 205   NEUTROS ABS Thousands/µL  --   --  2 83         Results from last 7 days   Lab Units 06/22/23  0453 06/21/23  1738 06/21/23  0947   SODIUM mmol/L 136 136 138   POTASSIUM mmol/L 3 8 3 5 4 5   CHLORIDE mmol/L 107 107 106   CO2 mmol/L 25 23 13*   ANION GAP mmol/L 4 6 19   BUN mg/dL 9 10 13   CREATININE mg/dL 1 05 1 13 1 26   EGFR ml/min/1 73sq m 98 90 79   CALCIUM mg/dL 7 6* 8 2* 8 3*   CALCIUM, IONIZED mmol/L 1 06*  --   --    MAGNESIUM mg/dL 2 5 2 3  --    PHOSPHORUS mg/dL 2 8  --   -- Results from last 7 days   Lab Units 06/22/23  0453 06/21/23  0947   AST U/L 17 25   ALT U/L 12 16   ALK PHOS U/L 35 38   TOTAL PROTEIN g/dL 6 0* 7 5   ALBUMIN g/dL 3 5 4 4   TOTAL BILIRUBIN mg/dL 1 27* 0 73   BILIRUBIN DIRECT mg/dL  --  0 14     Results from last 7 days   Lab Units 06/22/23  0310 06/21/23  0933   POC GLUCOSE mg/dl 84 121     Results from last 7 days   Lab Units 06/22/23  0453 06/21/23  1738 06/21/23  0947   GLUCOSE RANDOM mg/dL 83 98 132       Results from last 7 days   Lab Units 06/22/23  0453 06/21/23  1738 06/21/23  1304   PH NADEGE  7 394 7 450* 7 438*   PCO2 NADEGE mm Hg 39 2* 33 9* 28 1*   PO2 NADEGE mm Hg 38 7 61 2* 169 0*   HCO3 NADEGE mmol/L 23 4* 23 0* 18 6*   BASE EXC NADEGE mmol/L -1 2 -0 3 -4 1   O2 CONTENT NADEGE ml/dL 15 0 19 2 20 1   O2 HGB, VENOUS % 75 7 92 8* 97 3*         Results from last 7 days   Lab Units 06/21/23  1738 06/21/23  0947   CK TOTAL U/L 252 248       Results from last 7 days   Lab Units 06/21/23  1218   LACTIC ACID mmol/L 1 9         Results from last 7 days   Lab Units 06/21/23  0947   AMPH/METH  Negative   BARBITURATE UR  Negative   BENZODIAZEPINE UR  Negative   COCAINE UR  Negative   METHADONE URINE  Negative   OPIATE UR  Negative   PCP UR  Negative   THC UR  Negative     Results from last 7 days   Lab Units 06/21/23  0947   ETHANOL LVL mg/dL <10   ACETAMINOPHEN LVL ug/mL <48*   SALICYLATE LVL mg/dL <5       ED Treatment:   Medication Administration from 06/21/2023 0925 to 06/21/2023 1205       Date/Time Order Dose Route Action     06/21/2023 0941 EDT propofol (DIPRIVAN) 1000 mg in 100 mL infusion (premix) 5 mcg/kg/min Intravenous New Bag     06/21/2023 0946 EDT Succinylcholine Chloride 100 mg/5 mL syringe 98 mg 150 mg Intravenous Given     06/21/2023 0945 EDT etomidate (AMIDATE) 2 mg/mL injection 20 mg 30 mg Intravenous Given          Admitting Diagnosis: Overdose [T50 901A]  Age/Sex: 24 y o  male  Admission Orders:  SCDs  1:1 continual observation   UP and OOB  Neuro checks every shift  SCDs    Scheduled Medications:  chlorhexidine, 15 mL, Mouth/Throat, Q12H CORRINA  enoxaparin, 40 mg, Subcutaneous, Daily      Continuous IV Infusions:     PRN Meds:  acetaminophen, 650 mg, Oral, Q6H PRN  phenol, 1 spray, Mouth/Throat, Q2H PRN        IP CONSULT TO TOXICOLOGY  IP CONSULT TO CASE MANAGEMENT  IP CONSULT TO PSYCHIATRY    Network Utilization Review Department  ATTENTION: Please call with any questions or concerns to [phone] and carefully listen to the prompts so that you are directed to the right person  All voicemails are confidential   [facility] Members all requests for admission clinical reviews, approved or denied determinations and any other requests to dedicated fax number below belonging to the campus where the patient is receiving treatment   List of dedicated fax numbers for the Facilities:  [address] DENIALS (Administrative/Medical Necessity) [phone]   [address] (Maternity/NICU/Pediatrics) [phone]   [address]lvarez [phone]   [facility] Jeronimo  [phone]   [address] [place] [address] Medical [address] [place] [address] Esther Valentin 28 [phone]   [address] [place] [facility] 134 815 [facility] [phone]

## 2023-06-22 NOTE — RESPIRATORY THERAPY NOTE
RT Ventilator Management Note  Monisha Caal 24 y o  male MRN: [id number]  Unit/Bed#:  Encounter: [id number]      Daily Screen       6/21/2023  0931 6/21/2023 2121          Patient safety screen outcome[de-identified] Failed Failed      Not Ready for Weaning due to[de-identified] Underline problem not resolved Underline problem not resolved              Physical Exam:   Assessment Type: Assess only  General Appearance: Drowsy, Sedated, Eyes open/responds to stimulus  Respiratory Pattern: Assisted  Chest Assessment: Chest expansion symmetrical  Bilateral Breath Sounds: Clear  Cough: None  O2 Device: mechanical vemntilation  Subjective Data: sedated      Resp Comments: patient remains intubated and sedated on full mechanical ventilation et tube in l=place and ecure tie etsnion aedqauet no eveidence of skin breakdown noted at this time b s  clear and eqaul no advertes breath sounds noted pateitn resting wihout apparen trespioratory distress plan: contineu curre t suport unitl further orderds

## 2023-06-23 ENCOUNTER — HOSPITAL ENCOUNTER (INPATIENT)
Facility: HOSPITAL | Age: 23
LOS: 7 days | Discharge: HOME | DRG: 754 | End: 2023-06-30
Attending: STUDENT IN AN ORGANIZED HEALTH CARE EDUCATION/TRAINING PROGRAM | Admitting: STUDENT IN AN ORGANIZED HEALTH CARE EDUCATION/TRAINING PROGRAM
Payer: COMMERCIAL

## 2023-06-23 VITALS
BODY MASS INDEX: 17.61 KG/M2 | OXYGEN SATURATION: 97 % | WEIGHT: 123.02 LBS | HEIGHT: 70 IN | DIASTOLIC BLOOD PRESSURE: 59 MMHG | RESPIRATION RATE: 15 BRPM | TEMPERATURE: 98.4 F | HEART RATE: 73 BPM | SYSTOLIC BLOOD PRESSURE: 107 MMHG

## 2023-06-23 DIAGNOSIS — F39 MOOD DISORDER (HCC): ICD-10-CM

## 2023-06-23 DIAGNOSIS — F41.9 ANXIETY DISORDER, UNSPECIFIED: Primary | Chronic | ICD-10-CM

## 2023-06-23 DIAGNOSIS — T50.901A OVERDOSE: ICD-10-CM

## 2023-06-23 DIAGNOSIS — F32.2 CURRENT SEVERE EPISODE OF MAJOR DEPRESSIVE DISORDER WITHOUT PSYCHOTIC FEATURES WITHOUT PRIOR EPISODE (HCC): Chronic | ICD-10-CM

## 2023-06-23 PROBLEM — T45.0X2A INTENTIONAL DIPHENHYDRAMINE OVERDOSE (HCC): Status: ACTIVE | Noted: 2023-06-23

## 2023-06-23 PROBLEM — E43 SEVERE PROTEIN-CALORIE MALNUTRITION (HCC): Status: ACTIVE | Noted: 2023-06-23

## 2023-06-23 PROBLEM — R45.851 SUICIDAL IDEATION: Status: ACTIVE | Noted: 2023-06-23

## 2023-06-23 PROBLEM — J96.00 ACUTE RESPIRATORY FAILURE (HCC): Status: RESOLVED | Noted: 2023-06-21 | Resolved: 2023-06-23

## 2023-06-23 PROBLEM — Z72.0 TOBACCO USE: Status: ACTIVE | Noted: 2023-06-23

## 2023-06-23 LAB
ANION GAP SERPL CALCULATED.3IONS-SCNC: 5 MMOL/L
BUN SERPL-MCNC: 9 MG/DL (ref 5–25)
CALCIUM SERPL-MCNC: 8.8 MG/DL (ref 8.4–10.2)
CHLORIDE SERPL-SCNC: 107 MMOL/L (ref 96–108)
CO2 SERPL-SCNC: 26 MMOL/L (ref 21–32)
CREAT SERPL-MCNC: 1.07 MG/DL (ref 0.6–1.3)
ERYTHROCYTE [DISTWIDTH] IN BLOOD BY AUTOMATED COUNT: 12.4 % (ref 11.6–15.1)
FLUAV RNA RESP QL NAA+PROBE: NEGATIVE
FLUBV RNA RESP QL NAA+PROBE: NEGATIVE
GFR SERPL CREATININE-BSD FRML MDRD: 96 ML/MIN/1.73SQ M
GLUCOSE SERPL-MCNC: 100 MG/DL (ref 65–140)
HCT VFR BLD AUTO: 40.7 % (ref 36.5–49.3)
HGB BLD-MCNC: 14 G/DL (ref 12–17)
MCH RBC QN AUTO: 31.1 PG (ref 26.8–34.3)
MCHC RBC AUTO-ENTMCNC: 34.4 G/DL (ref 31.4–37.4)
MCV RBC AUTO: 90 FL (ref 82–98)
PLATELET # BLD AUTO: 194 THOUSANDS/UL (ref 149–390)
PMV BLD AUTO: 9.8 FL (ref 8.9–12.7)
POTASSIUM SERPL-SCNC: 4.2 MMOL/L (ref 3.5–5.3)
RBC # BLD AUTO: 4.5 MILLION/UL (ref 3.88–5.62)
RSV RNA RESP QL NAA+PROBE: NEGATIVE
SARS-COV-2 RNA RESP QL NAA+PROBE: NEGATIVE
SODIUM SERPL-SCNC: 138 MMOL/L (ref 135–147)
WBC # BLD AUTO: 10.23 THOUSAND/UL (ref 4.31–10.16)

## 2023-06-23 PROCEDURE — 80048 BASIC METABOLIC PNL TOTAL CA: CPT

## 2023-06-23 PROCEDURE — 0241U HB NFCT DS VIR RESP RNA 4 TRGT: CPT | Performed by: INTERNAL MEDICINE

## 2023-06-23 PROCEDURE — 99239 HOSP IP/OBS DSCHRG MGMT >30: CPT | Performed by: INTERNAL MEDICINE

## 2023-06-23 PROCEDURE — 99406 BEHAV CHNG SMOKING 3-10 MIN: CPT | Performed by: INTERNAL MEDICINE

## 2023-06-23 PROCEDURE — 85027 COMPLETE CBC AUTOMATED: CPT

## 2023-06-23 PROCEDURE — 99233 SBSQ HOSP IP/OBS HIGH 50: CPT | Performed by: INTERNAL MEDICINE

## 2023-06-23 RX ORDER — MAGNESIUM HYDROXIDE/ALUMINUM HYDROXICE/SIMETHICONE 120; 1200; 1200 MG/30ML; MG/30ML; MG/30ML
30 SUSPENSION ORAL EVERY 4 HOURS PRN
Status: CANCELLED | OUTPATIENT
Start: 2023-06-23

## 2023-06-23 RX ORDER — LORAZEPAM 2 MG/ML
1 INJECTION INTRAMUSCULAR
Status: CANCELLED | OUTPATIENT
Start: 2023-06-23

## 2023-06-23 RX ORDER — HALOPERIDOL 5 MG/ML
2.5 INJECTION INTRAMUSCULAR
Status: DISCONTINUED | OUTPATIENT
Start: 2023-06-23 | End: 2023-06-30 | Stop reason: HOSPADM

## 2023-06-23 RX ORDER — LANOLIN ALCOHOL/MO/W.PET/CERES
3 CREAM (GRAM) TOPICAL
Status: CANCELLED | OUTPATIENT
Start: 2023-06-23

## 2023-06-23 RX ORDER — TRAZODONE HYDROCHLORIDE 50 MG/1
50 TABLET ORAL
Status: DISCONTINUED | OUTPATIENT
Start: 2023-06-23 | End: 2023-06-30 | Stop reason: HOSPADM

## 2023-06-23 RX ORDER — POLYETHYLENE GLYCOL 3350 17 G/17G
17 POWDER, FOR SOLUTION ORAL DAILY PRN
Status: DISCONTINUED | OUTPATIENT
Start: 2023-06-23 | End: 2023-06-30 | Stop reason: HOSPADM

## 2023-06-23 RX ORDER — BISACODYL 10 MG
10 SUPPOSITORY, RECTAL RECTAL DAILY PRN
Status: DISCONTINUED | OUTPATIENT
Start: 2023-06-23 | End: 2023-06-30 | Stop reason: HOSPADM

## 2023-06-23 RX ORDER — HALOPERIDOL 5 MG/ML
2.5 INJECTION INTRAMUSCULAR
Status: CANCELLED | OUTPATIENT
Start: 2023-06-23

## 2023-06-23 RX ORDER — MAGNESIUM HYDROXIDE/ALUMINUM HYDROXICE/SIMETHICONE 120; 1200; 1200 MG/30ML; MG/30ML; MG/30ML
30 SUSPENSION ORAL EVERY 4 HOURS PRN
Status: DISCONTINUED | OUTPATIENT
Start: 2023-06-23 | End: 2023-06-30 | Stop reason: HOSPADM

## 2023-06-23 RX ORDER — BENZTROPINE MESYLATE 1 MG/1
1 TABLET ORAL
Status: CANCELLED | OUTPATIENT
Start: 2023-06-23

## 2023-06-23 RX ORDER — ACETAMINOPHEN 325 MG/1
650 TABLET ORAL EVERY 6 HOURS PRN
Status: DISCONTINUED | OUTPATIENT
Start: 2023-06-23 | End: 2023-06-30 | Stop reason: HOSPADM

## 2023-06-23 RX ORDER — ACETAMINOPHEN 325 MG/1
650 TABLET ORAL EVERY 4 HOURS PRN
Status: CANCELLED | OUTPATIENT
Start: 2023-06-23

## 2023-06-23 RX ORDER — ACETAMINOPHEN 325 MG/1
650 TABLET ORAL EVERY 6 HOURS PRN
Status: CANCELLED | OUTPATIENT
Start: 2023-06-23

## 2023-06-23 RX ORDER — HALOPERIDOL 1 MG/1
1 TABLET ORAL EVERY 6 HOURS PRN
Status: DISCONTINUED | OUTPATIENT
Start: 2023-06-23 | End: 2023-06-30 | Stop reason: HOSPADM

## 2023-06-23 RX ORDER — DIPHENHYDRAMINE HYDROCHLORIDE 50 MG/ML
50 INJECTION INTRAMUSCULAR; INTRAVENOUS EVERY 6 HOURS PRN
Status: CANCELLED | OUTPATIENT
Start: 2023-06-23

## 2023-06-23 RX ORDER — BENZTROPINE MESYLATE 1 MG/ML
0.5 INJECTION INTRAMUSCULAR; INTRAVENOUS
Status: DISCONTINUED | OUTPATIENT
Start: 2023-06-23 | End: 2023-06-30 | Stop reason: HOSPADM

## 2023-06-23 RX ORDER — AMOXICILLIN 250 MG
1 CAPSULE ORAL DAILY PRN
Status: CANCELLED | OUTPATIENT
Start: 2023-06-23

## 2023-06-23 RX ORDER — LORAZEPAM 2 MG/ML
2 INJECTION INTRAMUSCULAR EVERY 6 HOURS PRN
Status: DISCONTINUED | OUTPATIENT
Start: 2023-06-23 | End: 2023-06-30 | Stop reason: HOSPADM

## 2023-06-23 RX ORDER — LORAZEPAM 2 MG/ML
1 INJECTION INTRAMUSCULAR
Status: DISCONTINUED | OUTPATIENT
Start: 2023-06-23 | End: 2023-06-30 | Stop reason: HOSPADM

## 2023-06-23 RX ORDER — LORAZEPAM 2 MG/ML
2 INJECTION INTRAMUSCULAR
Status: CANCELLED | OUTPATIENT
Start: 2023-06-23

## 2023-06-23 RX ORDER — NICOTINE 21 MG/24HR
21 PATCH, TRANSDERMAL 24 HOURS TRANSDERMAL DAILY
Status: DISCONTINUED | OUTPATIENT
Start: 2023-06-23 | End: 2023-06-23 | Stop reason: HOSPADM

## 2023-06-23 RX ORDER — LORAZEPAM 2 MG/ML
2 INJECTION INTRAMUSCULAR
Status: DISCONTINUED | OUTPATIENT
Start: 2023-06-23 | End: 2023-06-30 | Stop reason: HOSPADM

## 2023-06-23 RX ORDER — ACETAMINOPHEN 325 MG/1
650 TABLET ORAL EVERY 4 HOURS PRN
Status: DISCONTINUED | OUTPATIENT
Start: 2023-06-23 | End: 2023-06-30 | Stop reason: HOSPADM

## 2023-06-23 RX ORDER — LORAZEPAM 2 MG/ML
2 INJECTION INTRAMUSCULAR EVERY 6 HOURS PRN
Status: CANCELLED | OUTPATIENT
Start: 2023-06-23

## 2023-06-23 RX ORDER — BENZTROPINE MESYLATE 1 MG/1
1 TABLET ORAL
Status: DISCONTINUED | OUTPATIENT
Start: 2023-06-23 | End: 2023-06-30 | Stop reason: HOSPADM

## 2023-06-23 RX ORDER — HALOPERIDOL 5 MG/1
2.5 TABLET ORAL
Status: CANCELLED | OUTPATIENT
Start: 2023-06-23

## 2023-06-23 RX ORDER — HALOPERIDOL 5 MG/1
5 TABLET ORAL
Status: CANCELLED | OUTPATIENT
Start: 2023-06-23

## 2023-06-23 RX ORDER — LANOLIN ALCOHOL/MO/W.PET/CERES
3 CREAM (GRAM) TOPICAL
Status: DISCONTINUED | OUTPATIENT
Start: 2023-06-23 | End: 2023-06-28

## 2023-06-23 RX ORDER — HYDROXYZINE 50 MG/1
50 TABLET, FILM COATED ORAL
Status: DISCONTINUED | OUTPATIENT
Start: 2023-06-23 | End: 2023-06-30 | Stop reason: HOSPADM

## 2023-06-23 RX ORDER — BENZTROPINE MESYLATE 1 MG/ML
1 INJECTION INTRAMUSCULAR; INTRAVENOUS
Status: DISCONTINUED | OUTPATIENT
Start: 2023-06-23 | End: 2023-06-30 | Stop reason: HOSPADM

## 2023-06-23 RX ORDER — HALOPERIDOL 5 MG/ML
5 INJECTION INTRAMUSCULAR
Status: DISCONTINUED | OUTPATIENT
Start: 2023-06-23 | End: 2023-06-30 | Stop reason: HOSPADM

## 2023-06-23 RX ORDER — HYDROXYZINE HYDROCHLORIDE 25 MG/1
50 TABLET, FILM COATED ORAL
Status: CANCELLED | OUTPATIENT
Start: 2023-06-23

## 2023-06-23 RX ORDER — HYDROXYZINE 50 MG/1
100 TABLET, FILM COATED ORAL
Status: DISCONTINUED | OUTPATIENT
Start: 2023-06-23 | End: 2023-06-30 | Stop reason: HOSPADM

## 2023-06-23 RX ORDER — TRAZODONE HYDROCHLORIDE 50 MG/1
50 TABLET ORAL
Status: CANCELLED | OUTPATIENT
Start: 2023-06-23

## 2023-06-23 RX ORDER — AMOXICILLIN 250 MG
1 CAPSULE ORAL DAILY PRN
Status: DISCONTINUED | OUTPATIENT
Start: 2023-06-23 | End: 2023-06-30 | Stop reason: HOSPADM

## 2023-06-23 RX ORDER — HALOPERIDOL 5 MG/ML
5 INJECTION INTRAMUSCULAR
Status: CANCELLED | OUTPATIENT
Start: 2023-06-23

## 2023-06-23 RX ORDER — BENZTROPINE MESYLATE 1 MG/ML
0.5 INJECTION INTRAMUSCULAR; INTRAVENOUS
Status: CANCELLED | OUTPATIENT
Start: 2023-06-23

## 2023-06-23 RX ORDER — HALOPERIDOL 5 MG/1
5 TABLET ORAL
Status: DISCONTINUED | OUTPATIENT
Start: 2023-06-23 | End: 2023-06-30 | Stop reason: HOSPADM

## 2023-06-23 RX ORDER — BENZTROPINE MESYLATE 1 MG/ML
1 INJECTION INTRAMUSCULAR; INTRAVENOUS
Status: CANCELLED | OUTPATIENT
Start: 2023-06-23

## 2023-06-23 RX ORDER — DIPHENHYDRAMINE HYDROCHLORIDE 50 MG/ML
50 INJECTION INTRAMUSCULAR; INTRAVENOUS EVERY 6 HOURS PRN
Status: DISCONTINUED | OUTPATIENT
Start: 2023-06-23 | End: 2023-06-30 | Stop reason: HOSPADM

## 2023-06-23 RX ORDER — HALOPERIDOL 1 MG/1
1 TABLET ORAL EVERY 6 HOURS PRN
Status: CANCELLED | OUTPATIENT
Start: 2023-06-23

## 2023-06-23 RX ORDER — HYDROXYZINE HYDROCHLORIDE 25 MG/1
25 TABLET, FILM COATED ORAL
Status: CANCELLED | OUTPATIENT
Start: 2023-06-23

## 2023-06-23 RX ORDER — ACETAMINOPHEN 325 MG/1
975 TABLET ORAL EVERY 6 HOURS PRN
Status: CANCELLED | OUTPATIENT
Start: 2023-06-23

## 2023-06-23 RX ORDER — HYDROXYZINE HYDROCHLORIDE 25 MG/1
25 TABLET, FILM COATED ORAL
Status: DISCONTINUED | OUTPATIENT
Start: 2023-06-23 | End: 2023-06-30 | Stop reason: HOSPADM

## 2023-06-23 RX ORDER — HYDROXYZINE HYDROCHLORIDE 25 MG/1
100 TABLET, FILM COATED ORAL
Status: CANCELLED | OUTPATIENT
Start: 2023-06-23

## 2023-06-23 RX ORDER — ACETAMINOPHEN 325 MG/1
975 TABLET ORAL EVERY 6 HOURS PRN
Status: DISCONTINUED | OUTPATIENT
Start: 2023-06-23 | End: 2023-06-30 | Stop reason: HOSPADM

## 2023-06-23 RX ORDER — POLYETHYLENE GLYCOL 3350 17 G/17G
17 POWDER, FOR SOLUTION ORAL DAILY PRN
Status: CANCELLED | OUTPATIENT
Start: 2023-06-23

## 2023-06-23 RX ORDER — BISACODYL 10 MG
10 SUPPOSITORY, RECTAL RECTAL DAILY PRN
Status: CANCELLED | OUTPATIENT
Start: 2023-06-23

## 2023-06-23 RX ADMIN — CHLORHEXIDINE GLUCONATE 0.12% ORAL RINSE 15 ML: 1.2 LIQUID ORAL at 09:30

## 2023-06-23 RX ADMIN — ENOXAPARIN SODIUM 40 MG: 40 INJECTION SUBCUTANEOUS at 09:30

## 2023-06-23 RX ADMIN — ACETAMINOPHEN 975 MG: 325 TABLET ORAL at 22:27

## 2023-06-23 NOTE — ED NOTES
CW sent clinicals to INTAKE      Pt will require in network placement as pt is not a resident of [place] and is not eligible for [facility]      TDS, CW

## 2023-06-23 NOTE — ED NOTES
Transport update:    [employer] is running late for the ride for [facility] in unit/room  bed    They are now scheduled to arrive at 7:50pm

## 2023-06-23 NOTE — ED NOTES
[employer] claimed the ride for [facility] in unit/room  bed , and will arrive on 06/23/2023 at 7:35pm

## 2023-06-23 NOTE — NURSING NOTE
Called report to [facility] nurse on 3B  Wife in patient's room  Notified patient and wife of  time

## 2023-06-23 NOTE — PROGRESS NOTES
[address] [place] [address]  Progress Note  Name: Santiago Olivo  MRN: [id number]  Unit/Bed#:  I Date of Admission: 6/21/2023   Date of Service: 6/23/2023 I Hospital Day: 2    Assessment/Plan   * Intentional diphenhydramine overdose [facility])  Assessment & Plan  Patient reported intentionally taking 30 tablets of Benadryl 50 mg  Evaluated by psychiatry and recommended for inpatient psychiatric rehab on discharge; involuntary if not voluntary  Patient is cleared for discharge pending IP mental health treatment  Appreciate CM input    Severe protein-calorie malnutrition ([address] )  Assessment & Plan  Malnutrition Findings:                                 BMI Findings: Body mass index is 17 65 kg/m²  Appreciate Nutrition input    Tobacco use  Assessment & Plan  Tobacco Use: High Risk (6/21/2023)    Patient History    • Smoking Tobacco Use: Some Days    • Smokeless Tobacco Use: Never    • Passive Exposure: Not on file     Tobacco cessation counseling provided for > 3 min  NRT ordered with patch and PRN nicorette gum      Adult BMI <19 kg/sq m  Assessment & Plan  Body mass index is 17 65 kg/m²  Nutrition service consult  Ensure supplement    Suicidal ideation  Assessment & Plan  Psych consulted  1:1; no utensils with food  Recommending IP psych unit  Medically cleared for IP unit    Prolonged Q-T interval on ECG  Assessment & Plan  Recent Labs     06/21/23  1358 06/21/23  1539 06/21/23  2148 06/21/23  2148   QTCINT 427 [phone]     Stable now    Acute respiratory failure (HCC)-resolved as of 6/23/2023  Assessment & Plan  Resp failure resolved; now extubated; Complaining of sore throat              VTE Pharmacologic Prophylaxis: VTE Score: 0 Low Risk (Score 0-2) - Encourage Ambulation  Patient Centered Rounds: I performed bedside rounds with nursing staff today    Discussions with Specialists or Other Care Team Provider: CM    Education and Discussions with Family / Patient: patient, spouse at bedside    Time Spent for Care: 75 minutes  More than 50% of total time spent on counseling and coordination of care as described above  Current Length of Stay: 2 day(s)  Current Patient Status: Inpatient   Certification Statement: The patient will continue to require additional inpatient hospital stay due to placement to inpatient psych  Discharge Plan: Anticipate discharge in 24-48 hrs to inpatient psych  Code Status: Level 1 - Full Code    Subjective:   Offers no new complaints at this time other than mild sore throat that he attributes to intubation  Understanding of plan  All questions answered  Agreeable to IP psych placement  Objective:     Vitals:   Temp (24hrs), Av 6 °F (37 6 °C), Min:98 4 °F (36 9 °C), Max:100 9 °F (38 3 °C)    Temp:  [98 4 °F (36 9 °C)-100 9 °F (38 3 °C)] 98 4 °F (36 9 °C)  HR:  [73-96] 73  Resp:  [12-16] 15  BP: (107-123)/(59-71) 107/59  SpO2:  [96 %-100 %] 97 %  Body mass index is 17 65 kg/m²  Input and Output Summary (last 24 hours):   No intake or output data in the 24 hours ending 23 1044    Physical Exam:   Physical Exam  Constitutional:       General: He is not in acute distress  Appearance: Normal appearance  HENT:      Head: Normocephalic  Mouth/Throat:      Mouth: Mucous membranes are moist       Pharynx: Oropharynx is clear  Cardiovascular:      Rate and Rhythm: Normal rate and regular rhythm  Pulses: Normal pulses  Heart sounds: Normal heart sounds  Pulmonary:      Effort: Pulmonary effort is normal       Breath sounds: Normal breath sounds  Abdominal:      General: Bowel sounds are normal  There is no distension  Palpations: Abdomen is soft  Skin:     General: Skin is warm and dry  Capillary Refill: Capillary refill takes less than 2 seconds            Additional Data:     Labs:  Results from last 7 days   Lab Units 23  0523 23  1236 23  1029   WBC Thousand/uL 10 23*   < > 3 78*   HEMOGLOBIN g/dL 14 0   < > 13 8   HEMATOCRIT % 40 7   < > 40 4   PLATELETS Thousands/uL 194   < > 205   NEUTROS PCT %  --   --  74   LYMPHS PCT %  --   --  16   MONOS PCT %  --   --  7   EOS PCT %  --   --  1    < > = values in this interval not displayed  Results from last 7 days   Lab Units 06/23/23  0523 06/22/23  0453   SODIUM mmol/L 138 136   POTASSIUM mmol/L 4 2 3 8   CHLORIDE mmol/L 107 107   CO2 mmol/L 26 25   BUN mg/dL 9 9   CREATININE mg/dL 1 07 1 05   ANION GAP mmol/L 5 4   CALCIUM mg/dL 8 8 7 6*   ALBUMIN g/dL  --  3 5   TOTAL BILIRUBIN mg/dL  --  1 27*   ALK PHOS U/L  --  35   ALT U/L  --  12   AST U/L  --  17   GLUCOSE RANDOM mg/dL 100 83         Results from last 7 days   Lab Units 06/22/23  0310 06/21/23  0933   POC GLUCOSE mg/dl 84 121         Results from last 7 days   Lab Units 06/21/23  1218   LACTIC ACID mmol/L 1 9       Lines/Drains:  Invasive Devices     Peripheral Intravenous Line  Duration           Peripheral IV 06/21/23 Right Forearm 2 days          Drain  Duration           Urethral Catheter Temperature probe 16 Fr  2 days          Airway  Duration           ETT  7 mm 2 days              Urinary Catheter:  Goal for removal: No longer needed  Will place order to discontinue               Imaging: Reviewed radiology reports from this admission including: CT head    CT head without contrast    Result Date: 6/21/2023  Impression: No acute intracranial abnormality  Workstation performed: [id number]     CT chest abdomen pelvis w contrast    Result Date: 6/21/2023  Impression: No acute findings in the chest, abdomen, or pelvis  Workstation performed: [id number]     XR chest 1 view portable    Result Date: 6/21/2023  Impression: ETT and NGT in place No acute cardiopulmonary disease  Workstation performed: [id number]       No Chest XR results available for this patient       Recent Cultures (last 7 days):         Last 24 Hours Medication List:   Current Facility-Administered Medications   Medication Dose Route Frequency Provider Last Rate   • acetaminophen  650 mg Oral Q6H PRN JOSELITO López     • chlorhexidine  15 mL Mouth/Throat Q12H [address] JOSELITO López     • enoxaparin  40 mg Subcutaneous Daily JOSELITO López     • nicotine  21 mg Transdermal Daily Errol Downing DO     • nicotine polacrilex  2 mg Oral Q2H PRN Errol Downing DO     • phenol  1 spray Mouth/Throat Q2H PRN JOSELITO López          Today, Patient Was Seen By: Nolvia Zabala DO    **Please Note: This note may have been constructed using a voice recognition system  **

## 2023-06-23 NOTE — ED NOTES
Patient is accepted at [facility]  Patient is accepted by Dr Diomedes Collins per Ethan/Intake  Transportation is arranged with pending  Transportation is scheduled for pending  Patient may go to the floor at anytime after 1900   Nurse report is to be called to [phone] prior to patient transfer

## 2023-06-23 NOTE — ED NOTES
Crisis prepared hospital packet, copies obtained for the record  EMTALA and Medical Necessity  Patient was informed again about his 61 51 81 rights and 72 hours notice  No other questions and concerns at this time

## 2023-06-23 NOTE — CASE MANAGEMENT
Case Management Discharge Planning Note    Patient name Jesus Ledesma  Location / MRN [id number]  : 2000 Date 2023       Current Admission Date: 2023  Current Admission Diagnosis:Anticholinergic drug overdose   Patient Active Problem List    Diagnosis Date Noted   • Anticholinergic drug overdose 2023   • Prolonged Q-T interval on ECG 2023   • Acute respiratory failure ([address] )    • Metabolic acidosis       LOS (days): 2  Geometric Mean LOS (GMLOS) (days):   Days to GMLOS:     OBJECTIVE:  Risk of Unplanned Readmission Score: 8 64         Current admission status: Inpatient   Preferred Pharmacy:   [facility] [address]  [address] [place] [address]  [facility]  [address]  Phone: [phone] Fax: [phone]    Primary Care Provider: No primary care provider on file  Primary Insurance: ALBERTO KEITA PENDING  Secondary Insurance:     DISCHARGE DETAILS:    Other Referral/Resources/Interventions Provided:  Interventions: Other (Specify) (WHO to crisis)  Referral Comments: CM completed the warm hand off check list for the crisis team  CM asked SLIM for a Covid test, a note that states patient is cleared for discharge to  psych, and a consult for ED crisis worker  SLIM agreeable to complete all 3  Check list then emailed to the crisis team  CM asked them to begin the bedsearch  Treatment Team Recommendation: Inpatient Behavioral Health  Discharge Destination Plan[de-identified] Inpatient Behavioral Health  Transport at Discharge : Other (Comment)  Dispatcher Contacted:  No

## 2023-06-24 PROBLEM — F41.9 ANXIETY DISORDER, UNSPECIFIED: Chronic | Status: ACTIVE | Noted: 2023-06-24

## 2023-06-24 PROBLEM — F32.2 CURRENT SEVERE EPISODE OF MAJOR DEPRESSIVE DISORDER WITHOUT PRIOR EPISODE (HCC): Chronic | Status: ACTIVE | Noted: 2023-06-24

## 2023-06-24 PROBLEM — F41.9 ANXIETY DISORDER, UNSPECIFIED: Status: ACTIVE | Noted: 2023-06-24

## 2023-06-24 PROBLEM — Z00.8 MEDICAL CLEARANCE FOR PSYCHIATRIC ADMISSION: Status: ACTIVE | Noted: 2023-06-24

## 2023-06-24 PROBLEM — F32.2 CURRENT SEVERE EPISODE OF MAJOR DEPRESSIVE DISORDER WITHOUT PRIOR EPISODE (HCC): Status: ACTIVE | Noted: 2023-06-24

## 2023-06-24 LAB
25(OH)D3 SERPL-MCNC: 33 NG/ML (ref 30–100)
ALBUMIN SERPL BCP-MCNC: 4 G/DL (ref 3.5–5)
ALP SERPL-CCNC: 35 U/L (ref 34–104)
ALT SERPL W P-5'-P-CCNC: 14 U/L (ref 7–52)
ANION GAP SERPL CALCULATED.3IONS-SCNC: 8 MMOL/L
AST SERPL W P-5'-P-CCNC: 17 U/L (ref 13–39)
ATRIAL RATE: 0 BPM
ATRIAL RATE: 63 BPM
ATRIAL RATE: 63 BPM
BILIRUB SERPL-MCNC: 0.65 MG/DL (ref 0.2–1)
BUN SERPL-MCNC: 13 MG/DL (ref 5–25)
CALCIUM SERPL-MCNC: 9.5 MG/DL (ref 8.4–10.2)
CHLORIDE SERPL-SCNC: 105 MMOL/L (ref 96–108)
CHOLEST SERPL-MCNC: 127 MG/DL
CO2 SERPL-SCNC: 26 MMOL/L (ref 21–32)
CREAT SERPL-MCNC: 1.01 MG/DL (ref 0.6–1.3)
FOLATE SERPL-MCNC: 10.1 NG/ML
GFR SERPL CREATININE-BSD FRML MDRD: 103 ML/MIN/1.73SQ M
GLUCOSE P FAST SERPL-MCNC: 94 MG/DL (ref 65–99)
GLUCOSE SERPL-MCNC: 94 MG/DL (ref 65–140)
HDLC SERPL-MCNC: 41 MG/DL
LDLC SERPL CALC-MCNC: 70 MG/DL (ref 0–100)
NONHDLC SERPL-MCNC: 86 MG/DL
P AXIS: 77 DEGREES
P AXIS: 80 DEGREES
POTASSIUM SERPL-SCNC: 4.1 MMOL/L (ref 3.5–5.3)
PR INTERVAL: 114 MS
PR INTERVAL: 116 MS
PROT SERPL-MCNC: 7.2 G/DL (ref 6.4–8.4)
QRS AXIS: 0 DEGREES
QRS AXIS: 91 DEGREES
QRS AXIS: 91 DEGREES
QRSD INTERVAL: 0 MS
QRSD INTERVAL: 92 MS
QRSD INTERVAL: 92 MS
QT INTERVAL: 0 MS
QT INTERVAL: 382 MS
QT INTERVAL: 386 MS
QTC INTERVAL: 0 MS
QTC INTERVAL: 390 MS
QTC INTERVAL: 395 MS
SODIUM SERPL-SCNC: 139 MMOL/L (ref 135–147)
T WAVE AXIS: 0 DEGREES
T WAVE AXIS: 61 DEGREES
T WAVE AXIS: 64 DEGREES
TRIGL SERPL-MCNC: 81 MG/DL
TSH SERPL DL<=0.05 MIU/L-ACNC: 0.88 UIU/ML (ref 0.45–4.5)
VENTRICULAR RATE: 0 BPM
VENTRICULAR RATE: 63 BPM
VENTRICULAR RATE: 63 BPM
VIT B12 SERPL-MCNC: 292 PG/ML (ref 180–914)

## 2023-06-24 PROCEDURE — 84443 ASSAY THYROID STIM HORMONE: CPT | Performed by: PSYCHIATRY & NEUROLOGY

## 2023-06-24 PROCEDURE — 93010 ELECTROCARDIOGRAM REPORT: CPT | Performed by: INTERNAL MEDICINE

## 2023-06-24 PROCEDURE — 82607 VITAMIN B-12: CPT | Performed by: PSYCHIATRY & NEUROLOGY

## 2023-06-24 PROCEDURE — 80053 COMPREHEN METABOLIC PANEL: CPT | Performed by: PSYCHIATRY & NEUROLOGY

## 2023-06-24 PROCEDURE — 82306 VITAMIN D 25 HYDROXY: CPT | Performed by: PSYCHIATRY & NEUROLOGY

## 2023-06-24 PROCEDURE — 80061 LIPID PANEL: CPT | Performed by: PSYCHIATRY & NEUROLOGY

## 2023-06-24 PROCEDURE — 99223 1ST HOSP IP/OBS HIGH 75: CPT | Performed by: STUDENT IN AN ORGANIZED HEALTH CARE EDUCATION/TRAINING PROGRAM

## 2023-06-24 PROCEDURE — 93005 ELECTROCARDIOGRAM TRACING: CPT

## 2023-06-24 PROCEDURE — 82746 ASSAY OF FOLIC ACID SERUM: CPT | Performed by: PSYCHIATRY & NEUROLOGY

## 2023-06-24 RX ORDER — ESCITALOPRAM OXALATE 5 MG/1
5 TABLET ORAL DAILY
Status: DISCONTINUED | OUTPATIENT
Start: 2023-06-24 | End: 2023-06-28

## 2023-06-24 RX ADMIN — ESCITALOPRAM 5 MG: 5 TABLET, FILM COATED ORAL at 18:25
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
## 2023-06-24 NOTE — NURSING NOTE
Patient is primarily isolative to room, visible for meals. He is constricted. He denies AVH/HI at this moment. He appears depressed. He asked for shower supplies and clean clothes to attend to his adl's. He is cooperative. He has had no behavioral issues to note. When asked if he had suicidal ideation, he paused and stated "No." Patient is regretful of his actions and reports he remembers all of it.

## 2023-06-24 NOTE — ASSESSMENT & PLAN NOTE
Prolonged Q-T interval on ECG  Assessment & Plan         Recent Labs     06/21/23  1358 06/21/23  1539 06/21/23  2148 06/21/23 2148   QTCINT [phone]   ·  Stable now

## 2023-06-24 NOTE — ASSESSMENT & PLAN NOTE
• At this time, patient appears medically stable to continue inpatient psychiatric management. • Current labs, nursing notes and imaging reviewed.   o Pending Labs: CBC, TSH, Vit D, & Vit B12  Recent EK/21: NSR rightward axis, nonspecific T wave abnormality, QT interval 389

## 2023-06-24 NOTE — CONSULTS
[facility]  Consult  Name: Sherine Diaz 24 y.o. male I MRN: [id number]  Unit/Bed#: [address] 352-01 I Date of Admission: 2023   Date of Service: 2023 I Hospital Day: 1    Inpatient consult for Medical Clearance for [facility] patient  Consult performed by: Tenzin Newton PA-C  Consult ordered by: Schuyler Pradhan MD        Assessment/Plan      Medical clearance for psychiatric admission  Assessment & Plan  • At this time, patient appears medically stable to continue inpatient psychiatric management. • Current labs, nursing notes and imaging reviewed. o Pending Labs: CBC, TSH, Vit D, & Vit B12  Recent EK/21: NSR rightward axis, nonspecific T wave abnormality, QT interval 389     Anticholinergic drug overdose  Assessment & Plan  · Admitted to [facility]  to intentional Benadryl OD w/ SI   · Intentionally consumed 30 tab of Benadryl 50 mg  · S/p acute respiratory failure w/ intubation and subsequent extubation   · Respiratory status is stable! · Psych management by primary team; medically cleared from [facility] for input psych stay     Prolonged Q-T interval on ECG  Assessment & Plan         Recent Labs     23  1358 23  1539 23  2148 23  2148   QTCINT 427 [phone]   ·  Stable now      Severe protein-calorie malnutrition ([address])  Assessment & Plan    BMI Findings: Body mass index is 16.99 kg/m². · Trend wt; encourage PO intake now that pt is awake    Tobacco use  Assessment & Plan  ·  Continue nicotine patch               Recommendations for Discharge:  · SLIM will sign off - please call with questions or concerns. · Follow up with PCP upon discharge. Counseling / Coordination of Care Time: 30 minutes. Greater than 50% of total time spent on patient counseling and coordination of care. Collaboration of Care:  Were Recommendations Directly Discussed with Primary Treatment Team? - Yes     History of Present Illness:    Sherine Diaz is a 24 y.o. male who is originally admitted to the psychiatric service due to attempted OD w/ benadryl. Pt was admitted to [facility] 2/2 to respiratory failure requiring intubation after intentional SI attempt. Pt has been medical cleared for inpatient psych admission. We are consulted for medical clearance for psychiatric hospitalization and medical management. Pt is kind and pleasant today. He is appreciative of the care he has received thus far. He has no specific complaints. He denies CP, SOB, DAVIS, abd pain, constipation, or urinary complaints. Review of Systems:    Review of Systems   Constitutional: Negative. HENT: Negative. Eyes: Negative. Respiratory: Negative for cough, shortness of breath and wheezing. Cardiovascular: Negative for chest pain, palpitations and leg swelling. Gastrointestinal: Negative for abdominal distention, abdominal pain, constipation, diarrhea and nausea. Genitourinary: Negative for difficulty urinating and dysuria. Musculoskeletal: Negative for arthralgias and myalgias. Skin: Negative. Neurological: Negative for dizziness, light-headedness and headaches. Psychiatric/Behavioral: Negative for sleep disturbance and suicidal ideas. The patient is not nervous/anxious. Past Medical and Surgical History:     Past Medical History:   Diagnosis Date   • Self-injurious behavior    • Suicide attempt ([address])        No past surgical history on file.     Meds/Allergies:    all medications and allergies reviewed    Allergies: No Known Allergies    Social History:     Marital Status: /Civil Union    Substance Use History:   Social History     Substance and Sexual Activity   Alcohol Use Yes    Comment: "occassionally" per wife     Social History     Tobacco Use   Smoking Status Some Days   • Packs/day: 0.25   • Years: 5.00   • Total pack years: 1.25   • Types: Cigarettes   • Start date: 6/1/2016   Smokeless Tobacco Never     Social History     Substance and Sexual Activity Drug Use Never       Family History:    non-contributory    Physical Exam:     Vitals:   Blood Pressure: 101/56 (06/24/23 1134)  Pulse: 71 (06/24/23 1134)  Temperature: 98 °F (36.7 °C) (06/24/23 1134)  Temp Source: Temporal (06/24/23 1134)  Respirations: 16 (06/24/23 1134)  Height: 5' 10" (177.8 cm) (06/23/23 2144)  Weight - Scale: 53.7 kg (118 lb 6.4 oz) (06/23/23 2144)  SpO2: 100 % (06/24/23 1134)    Physical Exam  Constitutional:       Comments: Thin, somewhat frail   HENT:      Head: Normocephalic. Mouth/Throat:      Mouth: Mucous membranes are moist.      Pharynx: Oropharynx is clear. Eyes:      Extraocular Movements: Extraocular movements intact. Conjunctiva/sclera: Conjunctivae normal.   Cardiovascular:      Rate and Rhythm: Normal rate and regular rhythm. Pulmonary:      Effort: Pulmonary effort is normal.      Breath sounds: Normal breath sounds. No wheezing, rhonchi or rales. Abdominal:      General: Abdomen is flat. Bowel sounds are normal. There is no distension. Palpations: Abdomen is soft. Tenderness: There is no abdominal tenderness. Musculoskeletal:         General: No swelling or deformity. Normal range of motion. Cervical back: Normal range of motion and neck supple. Skin:     General: Skin is warm. Neurological:      Mental Status: He is alert. Psychiatric:         Mood and Affect: Mood normal.         Thought Content: Thought content normal.         Judgment: Judgment normal.      Comments: Pleasant         Additional Data:     Lab Results:     Results from last 7 days   Lab Units 06/23/23  0523 06/21/23  1236 06/21/23  1029   WBC Thousand/uL 10.23*   < > 3.78*   HEMOGLOBIN g/dL 14.0   < > 13.8   HEMATOCRIT % 40.7   < > 40.4   PLATELETS Thousands/uL 194   < > 205   NEUTROS PCT %  --   --  74   LYMPHS PCT %  --   --  16   MONOS PCT %  --   --  7   EOS PCT %  --   --  1    < > = values in this interval not displayed.      Results from last 7 days   Lab Units 06/24/23  0546   SODIUM mmol/L 139   POTASSIUM mmol/L 4.1   CHLORIDE mmol/L 105   CO2 mmol/L 26   BUN mg/dL 13   CREATININE mg/dL 1.01   ANION GAP mmol/L 8   CALCIUM mg/dL 9.5   ALBUMIN g/dL 4.0   TOTAL BILIRUBIN mg/dL 0.65   ALK PHOS U/L 35   ALT U/L 14   AST U/L 17   GLUCOSE RANDOM mg/dL 94             No results found for: "HGBA1C"  Results from last 7 days   Lab Units 06/22/23  0310 06/21/23  0933   POC GLUCOSE mg/dl 84 121     Results from last 7 days   Lab Units 06/21/23  1218   LACTIC ACID mmol/L 1.9       Imaging: I have personally reviewed pertinent reports. No orders to display       EKG, Pathology, and Other Studies Reviewed on Admission:   · EKG: see above documentation    ** Please Note: This note has been constructed using a voice recognition system.  **

## 2023-06-24 NOTE — TREATMENT PLAN
TREATMENT PLAN REVIEW - [facility] 23 y.o. 2000 male MRN: [id number]    [facility] [address] Room / Bed: [facility] 352-01 Encounter: [id number]          Admit Date/Time:  6/23/2023  9:33 PM    Treatment Team: Attending Provider: Guerda Magallanes MD; Consulting Physician: Lise Henry DO; Registered Nurse: Suzanne Patel RN; Patient Care Assistant: Thuan Romano; Registered Nurse: Fitz Tamayo RN; Patient Care Technician: Tricia Alanis; Nursing Student: Yoana Suggs;  Patient Care Assistant: Carmen Lainez    Diagnosis: Principal Problem:    Current severe episode of major depressive disorder without prior episode ([address])  Active Problems:    Anticholinergic drug overdose    Prolonged Q-T interval on ECG    Severe protein-calorie malnutrition (HCC)    Tobacco use    Medical clearance for psychiatric admission    Anxiety disorder, unspecified      Patient Strengths/Assets: average or above intelligence, cooperative, family ties, motivation for treatment/growth, negotiates basic needs, patient is on a voluntary commitment    Patient Barriers/Limitations: low self esteem, poor insight    Short Term Goals: decrease in depressive symptoms, decrease in anxiety symptoms, decrease in suicidal thoughts    Long Term Goals: improvement in depression, improvement in anxiety, resolution of depressive symptoms, free of suicidal thoughts, no self abusive behavior    Progress Towards Goals: starting psychiatric medications as prescribed    Recommended Treatment: medication management, patient medication education, group therapy, milieu therapy, continued Behavioral Health psychiatric evaluation/assessment process    Treatment Frequency: daily medication monitoring, group and milieu therapy daily, monitoring through interdisciplinary rounds, monitoring through weekly patient care conferences    Expected Discharge Date:  10 days    Discharge Plan: referral for outpatient medication management with a psychiatrist, referral for outpatient psychotherapy    Treatment Plan Created/Updated By: Diego Hernandez MD

## 2023-06-24 NOTE — PLAN OF CARE
Problem: DEPRESSION  Goal: Will be euthymic at discharge  Description: INTERVENTIONS:  - Administer medication as ordered  - Provide emotional support via 1:1 interaction with staff  - Encourage involvement in milieu/groups/activities  - Monitor for social isolation  Outcome: Progressing     Problem: SELF HARM/SUICIDALITY  Goal: Will have no self-injury during hospital stay  Description: INTERVENTIONS:  - Q 15 MINUTES: Routine safety checks  - Q WAKING SHIFT & PRN: Assess risk to determine if routine checks are adequate to maintain patient safety  - Encourage patient to participate actively in care by formulating a plan to combat response to suicidal ideation, identify supports and resources  Outcome: Progressing     Problem: ANXIETY  Goal: Will report anxiety at manageable levels  Description: INTERVENTIONS:  - Administer medication as ordered  - Teach and encourage coping skills  - Provide emotional support  - Assess patient/family for anxiety and ability to cope  Outcome: Progressing  Goal: By discharge: Patient will verbalize 2 strategies to deal with anxiety  Description: Interventions:  - Identify any obvious source/trigger to anxiety  - Staff will assist patient in applying identified coping technique/skills  - Encourage attendance of scheduled groups and activities  Outcome: Progressing     Problem: SLEEP DISTURBANCE  Goal: Will exhibit normal sleeping pattern  Description: Interventions:  -  Assess the patients sleep pattern, noting recent changes  - Administer medication as ordered  - Decrease environmental stimuli, including noise, as appropriate during the night  - Encourage the patient to actively participate in unit groups and or exercise during the day to enhance ability to achieve adequate sleep at night  - Assess the patient, in the morning, encouraging a description of sleep experience  Outcome: Progressing

## 2023-06-24 NOTE — NURSING NOTE
Patient admitted on a 201 from [facility] ICU S/P Benadryl overdose in a suicide attempt. He was intubated on 6/21 and extubated 6/22. He had also been cutting his arms and has several superficial cuts on left forearm "I knew I wasn't bleeding enough so I took the overdose." He is unable to answer if he regrets the overdose or if he is relieved he survived "I can't answer that right now."   He cites multiple relational conflicts with parents, wife. He lives in [facility] and has been in [address] [place] [address] for a month and he will be returning to [facility] when he is discharged. He has spoken to his father and his father is coming to [address] [place] [address]. to see his son. Patient said "I have never seen my father crying" but apparently father was crying when they spoke. His mother will not talk to him. His throat is uncomfortable with a slight cough occasionally and his right arm is sore from tissue infiltration from the IV. He received at 2227 Tylenol 975mgs po prn for 10/10 pain at IV site. He was offered a cold compress but declined. Throat lozenges were offered and accepted. This is his first contact with psychiatry. He and his wife had marital counselling for approx 1 month last year.

## 2023-06-24 NOTE — PLAN OF CARE
Problem: SELF HARM/SUICIDALITY  Goal: Will have no self-injury during hospital stay  Description: INTERVENTIONS:  - Q 15 MINUTES: Routine safety checks  - Q WAKING SHIFT & PRN: Assess risk to determine if routine checks are adequate to maintain patient safety  - Encourage patient to participate actively in care by formulating a plan to combat response to suicidal ideation, identify supports and resources  Outcome: Progressing     Problem: DEPRESSION  Goal: Will be euthymic at discharge  Description: INTERVENTIONS:  - Administer medication as ordered  - Provide emotional support via 1:1 interaction with staff  - Encourage involvement in milieu/groups/activities  - Monitor for social isolation  Outcome: Not Progressing     Problem: ANXIETY  Goal: Will report anxiety at manageable levels  Description: INTERVENTIONS:  - Administer medication as ordered  - Teach and encourage coping skills  - Provide emotional support  - Assess patient/family for anxiety and ability to cope  Outcome: Not Progressing     Problem: ANXIETY  Goal: By discharge: Patient will verbalize 2 strategies to deal with anxiety  Description: Interventions:  - Identify any obvious source/trigger to anxiety  - Staff will assist patient in applying identified coping technique/skills  - Encourage attendance of scheduled groups and activities  Outcome: Not Progressing     Problem: SLEEP DISTURBANCE  Goal: Will exhibit normal sleeping pattern  Description: Interventions:  -  Assess the patients sleep pattern, noting recent changes  - Administer medication as ordered  - Decrease environmental stimuli, including noise, as appropriate during the night  - Encourage the patient to actively participate in unit groups and or exercise during the day to enhance ability to achieve adequate sleep at night  - Assess the patient, in the morning, encouraging a description of sleep experience  Outcome: Progressing

## 2023-06-24 NOTE — ASSESSMENT & PLAN NOTE
BMI Findings: Body mass index is 16.99 kg/m².    · Trend wt; encourage PO intake now that pt is awake

## 2023-06-24 NOTE — ASSESSMENT & PLAN NOTE
· Admitted to [facility] 2/2 to intentional Benadryl OD w/ SI   · Intentionally consumed 30 tab of Benadryl 50 mg  · S/p acute respiratory failure w/ intubation and subsequent extubation   · Respiratory status is stable!    · Psych management by primary team; medically cleared from [facility] for input psych stay

## 2023-06-24 NOTE — DISCHARGE SUMMARY
[address]  Discharge- Bebo Diaz 2000, 24 y o  male MRN: [id number]  Unit/Bed#:  Encounter: [id number]  Primary Care Provider: No primary care provider on file  Date and time admitted to hospital: 6/21/2023  9:25 AM    * Intentional diphenhydramine overdose [facility])  Assessment & Plan  Patient reported intentionally taking 30 tablets of Benadryl 50 mg  Evaluated by psychiatry and recommended for inpatient psychiatric rehab on discharge; involuntary if not voluntary  Patient is cleared for discharge pending IP mental health treatment  Discharge to IP Psych    Severe protein-calorie malnutrition ([facility] [address] )  Assessment & Plan  Malnutrition Findings:                                 BMI Findings: Body mass index is 17 65 kg/m²  Appreciate Nutrition input    Tobacco use  Assessment & Plan  Tobacco Use: High Risk (6/24/2023)    Patient History    • Smoking Tobacco Use: Some Days    • Smokeless Tobacco Use: Never    • Passive Exposure: Not on file     Tobacco cessation counseling provided for > 3 min  NRT ordered with patch and PRN nicorette gum      Adult BMI <19 kg/sq m  Assessment & Plan  Body mass index is 17 65 kg/m²  Nutrition service consult  Ensure supplement    Suicidal ideation  Assessment & Plan  Psych consulted  1:1; no utensils with food  Recommending IP psych unit  Medically cleared for IP unit    Prolonged Q-T interval on ECG  Assessment & Plan  Recent Labs     06/21/23  1358 06/21/23  1539 06/21/23  2148 06/21/23  2148   QTCINT 427 [phone]     Stable now    Acute respiratory failure (HCC)-resolved as of 6/23/2023  Assessment & Plan  Resp failure resolved; now extubated;   Complaining of sore throat        Medical Problems     Resolved Problems  Date Reviewed: 6/24/2023          Resolved    Acute respiratory failure ([facility] [address] ) 6/23/2023     Resolved by  Kwan Estevez DO        Discharging Physician / Practitioner: Kwan Estevez DO  PCP: Laurie "primary care provider on file  Admission Date:   Admission Orders (From admission, onward)     Ordered        06/21/23 1038  INPATIENT ADMISSION  Once            Signed and Held  ED TO DIFFERENT CAMPUS  [facility] UNIT or INPATIENT MEDICAL UNIT to  [facility] (using Discharge Readmit Navigator) - Admit Patient to [address] [place] [address]  Once,   Status:  Canceled                      Discharge Date: 06/23/23    Consultations During Hospital Stay:  · IP CONSULT TO TOXICOLOGY  · IP CONSULT TO CASE MANAGEMENT  · IP CONSULT TO PSYCHIATRY  · IP CONSULT TO NUTRITION SERVICES    Procedures Performed:   · Intubation and extubation    Significant Findings / Test Results:   No results found  No Chest XR results available for this patient  No results found for this or any previous visit  No results for input(s): \"BLOODCX\", \"SPUTUMCULTUR\", \"GRAMSTAIN\", \"URINECX\", \"WOUNDCULT\", \"BODYFLUIDCUL\", \"MRSACULTURE\", \"INFLUAPCR\", \"INFLUBPCR\", \"RSVPCR\", \"LEGIONELLAUR\", \"STPU\", \"CDIFFTOXINB\" in the last 72 hours  Incidental Findings:   · None other than noted above    Test Results Pending at Discharge (will require follow up): · None     Outpatient Tests Requested:  · None  Complications:  None    Reason for Admission:   Chief Complaint   Patient presents with   • Overdose - Accidental     Pt arrived via EMS after taking 30 50 mg benadryl to take a break , pt has lacerations on his right arm upon arrival  Pt was given Zofran in transport  EMS reports seizure activity during transport  Hospital Course:   Parul Mccann is a 24 y o  male patient who originally presented to the hospital on 6/21/2023  with no significant past medical history who presented with benadryl overdose of suicidal intent  Patient was alert on arrival of EMS and admitted to taking 30 tablets of Benadryl 50 mg because he wanted to [facility] a break  \" Of note patient found to have self harm cutting on his left arm   Patient denied suicidal " ideation/attempt to EMS  Patient's mental status declined en route and he suffered a tonic-clonic seizure  On arrival to the ED patient was obtunded and was intubated for airway protection  Exam consistent with anticholinergic overdose, except for pupils which appear constricted  QTc prolonged at 554 on arrival  Patient was admitted to the critical care department for further evaluation and management  Intubated for airway protection and then extubated  Deemed stable for discharge to  psych  Psych consulted and patient seeking voluntary admission  CM and Crisis arranged for transfer to  psych  Significant other at bedside, updated  Please see above list of diagnoses and related plan for additional information  Condition at Discharge: stable    Discharge Day Visit / Exam:   See same-day prog note    Discussion with Family: Updated  (significant other) at bedside  Discharge instructions/Information to patient and family:   See after visit summary for information provided to patient and family  Provisions for Follow-Up Care:  See after visit summary for information related to follow-up care and any pertinent home health orders  Disposition:   Inpatient Psychiatry    Planned Readmission: IP Psych     Discharge Statement:  I spent 79 minutes discharging the patient  This time was spent on the day of discharge  I had direct contact with the patient on the day of discharge  Greater than 50% of the total time was spent examining patient, answering all patient questions, arranging and discussing plan of care with patient as well as directly providing post-discharge instructions  Additional time then spent on discharge activities  Discharge Medications:  See after visit summary for reconciled discharge medications provided to patient and/or family        **Please Note: This note may have been constructed using a voice recognition system**

## 2023-06-25 LAB
ATRIAL RATE: 66 BPM
ATRIAL RATE: 68 BPM
ATRIAL RATE: 73 BPM
BACTERIA UR QL AUTO: ABNORMAL /HPF
BASOPHILS # BLD AUTO: 0.02 THOUSANDS/ÂΜL (ref 0–0.1)
BASOPHILS NFR BLD AUTO: 0 % (ref 0–1)
BILIRUB UR QL STRIP: NEGATIVE
CLARITY UR: CLEAR
COLOR UR: YELLOW
EOSINOPHIL # BLD AUTO: 0.07 THOUSAND/ÂΜL (ref 0–0.61)
EOSINOPHIL NFR BLD AUTO: 1 % (ref 0–6)
ERYTHROCYTE [DISTWIDTH] IN BLOOD BY AUTOMATED COUNT: 11.9 % (ref 11.6–15.1)
GLUCOSE SERPL-MCNC: 97 MG/DL (ref 65–140)
GLUCOSE UR STRIP-MCNC: NEGATIVE MG/DL
HCT VFR BLD AUTO: 45.5 % (ref 36.5–49.3)
HGB BLD-MCNC: 15.6 G/DL (ref 12–17)
HGB UR QL STRIP.AUTO: NEGATIVE
IMM GRANULOCYTES # BLD AUTO: 0.02 THOUSAND/UL (ref 0–0.2)
IMM GRANULOCYTES NFR BLD AUTO: 0 % (ref 0–2)
KETONES UR STRIP-MCNC: NEGATIVE MG/DL
LEUKOCYTE ESTERASE UR QL STRIP: NEGATIVE
LYMPHOCYTES # BLD AUTO: 1.58 THOUSANDS/ÂΜL (ref 0.6–4.47)
LYMPHOCYTES NFR BLD AUTO: 25 % (ref 14–44)
MCH RBC QN AUTO: 30.5 PG (ref 26.8–34.3)
MCHC RBC AUTO-ENTMCNC: 34.3 G/DL (ref 31.4–37.4)
MCV RBC AUTO: 89 FL (ref 82–98)
MONOCYTES # BLD AUTO: 0.52 THOUSAND/ÂΜL (ref 0.17–1.22)
MONOCYTES NFR BLD AUTO: 8 % (ref 4–12)
NEUTROPHILS # BLD AUTO: 4.24 THOUSANDS/ÂΜL (ref 1.85–7.62)
NEUTS SEG NFR BLD AUTO: 66 % (ref 43–75)
NITRITE UR QL STRIP: NEGATIVE
NON-SQ EPI CELLS URNS QL MICRO: ABNORMAL /HPF
NRBC BLD AUTO-RTO: 0 /100 WBCS
P AXIS: 81 DEGREES
P AXIS: 95 DEGREES
P AXIS: 96 DEGREES
PH UR STRIP.AUTO: 6 [PH]
PLATELET # BLD AUTO: 272 THOUSANDS/UL (ref 149–390)
PMV BLD AUTO: 9.6 FL (ref 8.9–12.7)
PR INTERVAL: 106 MS
PR INTERVAL: 106 MS
PR INTERVAL: 110 MS
PROT UR STRIP-MCNC: NEGATIVE MG/DL
QRS AXIS: 89 DEGREES
QRS AXIS: 91 DEGREES
QRS AXIS: 92 DEGREES
QRSD INTERVAL: 86 MS
QRSD INTERVAL: 88 MS
QRSD INTERVAL: 94 MS
QT INTERVAL: 372 MS
QT INTERVAL: 374 MS
QT INTERVAL: 374 MS
QTC INTERVAL: 392 MS
QTC INTERVAL: 397 MS
QTC INTERVAL: 409 MS
RBC # BLD AUTO: 5.11 MILLION/UL (ref 3.88–5.62)
RBC #/AREA URNS AUTO: ABNORMAL /HPF
SP GR UR STRIP.AUTO: 1.02 (ref 1–1.04)
T WAVE AXIS: 62 DEGREES
T WAVE AXIS: 66 DEGREES
T WAVE AXIS: 68 DEGREES
UROBILINOGEN UA: NEGATIVE MG/DL
VENTRICULAR RATE: 66 BPM
VENTRICULAR RATE: 68 BPM
VENTRICULAR RATE: 73 BPM
WBC # BLD AUTO: 6.45 THOUSAND/UL (ref 4.31–10.16)
WBC #/AREA URNS AUTO: ABNORMAL /HPF

## 2023-06-25 PROCEDURE — 99232 SBSQ HOSP IP/OBS MODERATE 35: CPT | Performed by: STUDENT IN AN ORGANIZED HEALTH CARE EDUCATION/TRAINING PROGRAM

## 2023-06-25 PROCEDURE — 81001 URINALYSIS AUTO W/SCOPE: CPT | Performed by: PSYCHIATRY & NEUROLOGY

## 2023-06-25 PROCEDURE — 82948 REAGENT STRIP/BLOOD GLUCOSE: CPT

## 2023-06-25 PROCEDURE — 85025 COMPLETE CBC W/AUTO DIFF WBC: CPT | Performed by: STUDENT IN AN ORGANIZED HEALTH CARE EDUCATION/TRAINING PROGRAM

## 2023-06-25 PROCEDURE — 93005 ELECTROCARDIOGRAM TRACING: CPT

## 2023-06-25 PROCEDURE — 93010 ELECTROCARDIOGRAM REPORT: CPT | Performed by: INTERNAL MEDICINE

## 2023-06-25 RX ADMIN — ESCITALOPRAM 5 MG: 5 TABLET, FILM COATED ORAL at 09:48

## 2023-06-25 NOTE — NURSING NOTE
Patient complaining of light-headedness, and chest pain. Patient reports no anxiety. He reports this is the second time feeling this sensation, he reports the first time was when he first woke up in the hospital on Wednesday after his OD SA on Benadryl. Abigail Grace notified, EKG performed, VSS, Blood sugar 97. Encouraged to push fluids for hydration.

## 2023-06-25 NOTE — NURSING NOTE
Patient brightens upon approach and is cooperative, patient states he wants to rest. He appears depressed. Patient has been medication compliant. Patient states he still has chest pain although minor, and a cough and feels it is related to his intubation recently. He denies AVH/SI/HI and has been visible earlier on the unit talking to family on the phone.

## 2023-06-25 NOTE — PLAN OF CARE
Problem: SELF HARM/SUICIDALITY  Goal: Will have no self-injury during hospital stay  Description: INTERVENTIONS:  - Q 15 MINUTES: Routine safety checks  - Q WAKING SHIFT & PRN: Assess risk to determine if routine checks are adequate to maintain patient safety  - Encourage patient to participate actively in care by formulating a plan to combat response to suicidal ideation, identify supports and resources  Outcome: Progressing     Problem: DEPRESSION  Goal: Will be euthymic at discharge  Description: INTERVENTIONS:  - Administer medication as ordered  - Provide emotional support via 1:1 interaction with staff  - Encourage involvement in milieu/groups/activities  - Monitor for social isolation  Outcome: Progressing     Problem: ANXIETY  Goal: Will report anxiety at manageable levels  Description: INTERVENTIONS:  - Administer medication as ordered  - Teach and encourage coping skills  - Provide emotional support  - Assess patient/family for anxiety and ability to cope  Outcome: Progressing     Problem: ANXIETY  Goal: Will report anxiety at manageable levels  Description: INTERVENTIONS:  - Administer medication as ordered  - Teach and encourage coping skills  - Provide emotional support  - Assess patient/family for anxiety and ability to cope  Outcome: Progressing     Problem: ANXIETY  Goal: Will report anxiety at manageable levels  Description: INTERVENTIONS:  - Administer medication as ordered  - Teach and encourage coping skills  - Provide emotional support  - Assess patient/family for anxiety and ability to cope  Outcome: Progressing  Goal: By discharge: Patient will verbalize 2 strategies to deal with anxiety  Description: Interventions:  - Identify any obvious source/trigger to anxiety  - Staff will assist patient in applying identified coping technique/skills  - Encourage attendance of scheduled groups and activities  Outcome: Progressing     Problem: SLEEP DISTURBANCE  Goal: Will exhibit normal sleeping pattern  Description: Interventions:  -  Assess the patients sleep pattern, noting recent changes  - Administer medication as ordered  - Decrease environmental stimuli, including noise, as appropriate during the night  - Encourage the patient to actively participate in unit groups and or exercise during the day to enhance ability to achieve adequate sleep at night  - Assess the patient, in the morning, encouraging a description of sleep experience  Outcome: Progressing

## 2023-06-25 NOTE — PROGRESS NOTES
Progress Note - 530 [facility] 24 y.o. male MRN: [id number]  Unit/Bed#: [facility] 352-01 Encounter: [id number]    Assessment/Plan   Principal Problem:    Current severe episode of major depressive disorder without prior episode ([address])  Active Problems:    Anticholinergic drug overdose    Prolonged Q-T interval on ECG    Severe protein-calorie malnutrition (HCC)    Tobacco use    Medical clearance for psychiatric admission    Anxiety disorder, unspecified    Recommended Treatment:   Continue Lexapro 5 mg daily for depression and anxiety. All current active medications have been reviewed  Encourage group therapy, milieu therapy and occupational therapy  Behavioral Health checks every 7 minutes  Medical management per SLIM. Commitment Status: 201  ----------------------------------------      Subjective: Patient discussed in nursing report and overnight notes and charting reviewed. Per nursing report, patient declined at bedtime melatonin last night. Describes some lightheadedness after vital signs which improved with hydration. He reports today that his mood is "alright". His affect still appears constricted and he is less reactive compared to yesterday. He reports that he has been ruminating about stressors and events leading to hospitalization. He reports that he had "some regret about the overdose". Reports that he spoke to his wife and parents yesterday Rick Lucio for his suicide attempt. He reports he was pleased to learn that they were supportive and are assisting him in any means that they can. 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   He reports that last night he had some difficulty sleeping intermittent awakenings though he feels this is due to being in the new environment of the hospital.  Reports energy levels are somewhat decreased today.  Describes no difficulty with appetite and feels he has been eating well. He describes a period of dizziness that occurred again this morning reports it lasted for approximately half an hour but improved with food and juice. He inquired if this was a side effect of the Lexapro and was advised that this would continue to be monitored and adjusted if needed. He denies any suicidal or homicidal ideations at this time. Denies any auditory or visual hallucinations. No paranoid symptoms are reported. He was encouraged to try to be more active in the milieu and was receptive to trying more group activities tomorrow. Behavior over the last 24 hours:  unchanged  Sleep: insomnia  Appetite: normal  Medication side effects: No  ROS: no complaints and all other systems are negative    Mental Status Evaluation:  Appearance:  casually dressed, marginal hygiene   Behavior:  cooperative, calm   Speech:  soft, monotone   Mood:  depressed   Affect:  constricted   Thought Process:  logical, coherent, goal directed   Associations: intact associations   Thought Content:  no overt delusions, ruminating thoughts   Perceptual Disturbances: no auditory hallucinations, no visual hallucinations, does not appear responding to internal stimuli   Risk Potential: Suicidal ideation - None at present  Homicidal ideation - None at present  Potential for aggression - No   Sensorium:  oriented to person, place and time/date   Memory:  recent and remote memory grossly intact   Consciousness:  alert and awake   Attention/Concentration: attention span and concentration are age appropriate   Insight:  limited   Judgment: limited   Gait/Station: normal gait/station   Motor Activity: no abnormal movements     Medications: all current active meds have been reviewed and continue current psychiatric medications.   Current Facility-Administered Medications   Medication Dose Route Frequency Provider Last Rate   • acetaminophen  650 mg Oral Q6H PRN Dana Puri MD     • acetaminophen  650 mg Oral Q4H PRN Dana Puri MD     • acetaminophen  975 mg Oral Q6H PRN Dana Puri MD     • aluminum-magnesium hydroxide-simethicone  30 mL Oral Q4H PRN Dana Puri MD     • haloperidol lactate  2.5 mg Intramuscular Q4H PRN Max 4/day Dana Puri MD      And   • LORazepam  1 mg Intramuscular Q4H PRN Max 4/day Dana Puri MD      And   • benztropine  0.5 mg Intramuscular Q4H PRN Max 4/day Dana Puri MD     • haloperidol lactate  5 mg Intramuscular Q4H PRN Max 4/day Dana Puri MD      And   • LORazepam  2 mg Intramuscular Q4H PRN Max 4/day Dana Puri MD      And   • benztropine  1 mg Intramuscular Q4H PRN Max 4/day Dana Puri MD     • benztropine  1 mg Oral Q4H PRN Max 6/day Dana Puri MD     • bisacodyl  10 mg Rectal Daily PRN Dana Puri MD     • hydrOXYzine HCL  50 mg Oral Q6H PRN Max 4/day Dana Puri MD      Or   • diphenhydrAMINE  50 mg Intramuscular Q6H PRN Dana Puri MD     • escitalopram  5 mg Oral Daily Shiva Woodson MD     • haloperidol  1 mg Oral Q6H PRN Dana Puri MD     • haloperidol  2.5 mg Oral Q4H PRN Max 4/day Dana Puri MD     • haloperidol  5 mg Oral Q4H PRN Max 4/day Dana Puri MD     • hydrOXYzine HCL  100 mg Oral Q6H PRN Max 4/day Dana Puri MD      Or   • LORazepam  2 mg Intramuscular Q6H PRN Dana Puri MD     • hydrOXYzine HCL  25 mg Oral Q6H PRN Max 4/day Dana Puri MD     • melatonin  3 mg Oral HS Dana Puri MD     • nicotine polacrilex  4 mg Oral Q2H PRN Dana Puri MD     • phenol  1 spray Mouth/Throat Q2H PRN Chula Euceda PA-C     • polyethylene glycol  17 g Oral Daily PRN Dana Puri MD     • senna-docusate sodium  1 tablet Oral Daily PRN Dana Puri MD     • traZODone  50 mg Oral HS PRN Dana Puri MD         Labs:  I have personally reviewed all pertinent laboratory/tests results  Most Recent Labs:     Most Recent Labs:   Lab Results   Component Value Date    WBC 6.45 06/25/2023    RBC 5.11 06/25/2023    HGB 15.6 06/25/2023    HCT 45.5 06/25/2023     06/25/2023    RDW 11.9 06/25/2023    NEUTROABS 4.24 06/25/2023    SODIUM 139 06/24/2023    K 4.1 06/24/2023     06/24/2023    CO2 26 06/24/2023    BUN 13 06/24/2023    CREATININE 1.01 06/24/2023    GLUC 94 06/24/2023    CALCIUM 9.5 06/24/2023    AST 17 06/24/2023    ALT 14 06/24/2023    ALKPHOS 35 06/24/2023    TP 7.2 06/24/2023    ALB 4.0 06/24/2023    TBILI 0.65 06/24/2023    CHOLESTEROL 127 06/24/2023    HDL 41 06/24/2023    TRIG 81 06/24/2023    LDLCALC 70 06/24/2023    NONHDLC 86 06/24/2023    [id number] 0.878 06/24/2023       Progress Toward Goals: progressing gradually    Risks / Benefits of Treatment:    Risks, benefits, and possible side effects of medications explained to patient and patient verbalizes understanding and agreement for treatment. Counseling / Coordination of Care: Total floor / unit time spent today 35 minutes. Greater than 50% of total time was spent with the patient and / or family counseling and / or coordination of care. A description of counseling / coordination of care:  Patient's progress discussed with staff in treatment team meeting. Treatment plan, treatment progress and medication changes were reviewed with nursing staff, pharmacy service, and case management in interdisciplinary treatment team meeting. Medications, treatment progress and treatment plan reviewed with patient. Recent stressors including family problems, family conflict, family issues and job stress discussed with patient. Educated on importance of medication and treatment compliance. Reassurance and supportive therapy provided. Encouraged participation in milieu and group therapy on the unit.       Ari Doss MD 06/25/23

## 2023-06-25 NOTE — NURSING NOTE
Patient has been in the milieu using the telephone and following along with the unit routine. He declined the HS Melatonin "I think I can sleep without anything."  He complained of lightheadedness a few minutes after vital signs were done and were well within normal range. He was asked if he is hydrating well and he said he is not really drinking well and he will increase his intake. He was verbal about his meeting with psychiatrist today and his decision to participate in treatment and hope to be discharged soon. He denies S.I.H.I. A/H V/H

## 2023-06-26 PROCEDURE — 99232 SBSQ HOSP IP/OBS MODERATE 35: CPT | Performed by: PSYCHIATRY & NEUROLOGY

## 2023-06-26 RX ORDER — LANOLIN ALCOHOL/MO/W.PET/CERES
3 CREAM (GRAM) TOPICAL DAILY PRN
Status: CANCELLED | OUTPATIENT
Start: 2023-06-26

## 2023-06-26 RX ADMIN — ESCITALOPRAM 5 MG: 5 TABLET, FILM COATED ORAL at 08:03

## 2023-06-26 NOTE — NURSING NOTE
Patient denies SI, HI, A/V hallucinations on assessment, chuckling the whole time while answering questions. Encouragement and reassurance provided to attend groups and reported doing so earlier in the day. Reports good appetite and content with knowing that discharge will happen when providers say so which patient is agreeable to stay as long as he needs to.

## 2023-06-26 NOTE — PROGRESS NOTES
Progress Note - 530 [facility] 24 y.o. male MRN: [id number]  Unit/Bed#: -01 Encounter: [id number]    Assessment/Plan   Principal Problem:    Current severe episode of major depressive disorder without prior episode ([address])  Active Problems:    Anticholinergic drug overdose    Prolonged Q-T interval on ECG    Severe protein-calorie malnutrition (HCC)    Tobacco use    Medical clearance for psychiatric admission    Anxiety disorder, unspecified      Recommended Treatment:   Continue Escitalopram 5 mg daily for depression and anxiety. If continues to tolerate well, plan to increase to 10mg daily in 2-3 days. Patient not interested in daily melatonin for sleep, so change to PRN. Continue with pharmacotherapy, group therapy, milieu therapy and occupational therapy. Continue to assess for adverse medication side effects. Encourage Glen Rodríguez to participate in nonverbal forms of therapy including journaling and art/music therapy. Continue frequent safety checks and vitals per unit protocol. Continue to engage CM/SW to assist with collateral, disposition planning, and the implementation of an individualized, patient-centered plan of care. Continue medical management by medical team.  Case discussed with treatment team.    Legal Status: 201  ------------------------------------------------------------    Subjective: All documentation including nursing notes, medication history to ensure medication adherence on the unit, labs, and vitals were reviewed. Kiel Manzo was evaluated this morning for continuity of care and no acute distress noted throughout the evaluation. Over the past 24 hours per nursing report, Kiel Manzo has been cooperative on the unit and compliant with Lexapro. Per nursing report, patient has been depressed and constricted, although appropriate in interaction.  Per nursing report he refused his scheduled HS melatonin, despite not sleeping well the night before. Today, Sandy Thomas is consenting for safety on the unit. Sandy Thomas reports feeling "normal."  He states that when he was admitted to the unit, he felt "numb". Sandy Thomas notes trouble sleeping due to it being "unbearably hot". Sandy Thomas states having a "good" appetite. Sandy Thomas has been taking Lexapro as prescribed and is not reporting side effects. Patient was seen and examined today in the interview room. Patient was guarded at the start of the interview, but opened up slightly as it progressed. We discussed some of Aline's stressors precipitating his suicide attempt, including problems with his wife and family stress. Patient expressed that he has communicated with his wife since being admitted and that it went "well". Patient advised to seek marriage counseling upon discharge. Patient also advised to attend groups to work on communication strategies. Patient appears to be superficial and down-plays his condition. When asked if Sandy Thomas had thoughts of harming himself, patient laughed outloud and shook his head while saying "no, man". In response to the question of how he is doing he states "everything's fine". Sandy Thomas denies current suicidal ideations. Sandy Thomas denies homicidal ideations. Regarding hallucinations, Sandy Thomas denies AH/VH. PRNs overnight: None  VS: Reviewed, within normal limits    Progress Toward Goals: slow improvement    Psychiatric Review of Systems:  Behavior over the last 24 hours:  unchanged  Sleep: difficulty sleeping due to it being "unbearably hot"  Appetite: normal  Medication side effects: No   ROS: no complaints    Vital signs in last 24 hours:  Temp:  [97.9 °F (36.6 °C)-98.1 °F (36.7 °C)] 97.9 °F (36.6 °C)  HR:  [84-89] 89  Resp:  [16] 16  BP: (116-121)/(56-65) 116/56    Laboratory results:  I have personally reviewed all pertinent laboratory/tests results.   Recent Results (from the past 48 hour(s))   ECG 12 lead    Collection Time: 06/24/23  4:29 PM   Result Value Ref Range    Ventricular Rate 0 BPM    Atrial Rate 0 BPM    FL Interval  ms    QRSD Interval 0 ms    QT Interval 0 ms    QTC Interval 0 ms    P Axis  degrees    QRS Axis 0 degrees    T Wave Axis 0 degrees   ECG 12 lead    Collection Time: 06/24/23  4:31 PM   Result Value Ref Range    Ventricular Rate 63 BPM    Atrial Rate 63 BPM    FL Interval 114 ms    QRSD Interval 92 ms    QT Interval 386 ms    QTC Interval 395 ms    P Axis 80 degrees    QRS Axis 91 degrees    T Wave Axis 61 degrees   ECG 12 lead    Collection Time: 06/24/23  4:32 PM   Result Value Ref Range    Ventricular Rate 63 BPM    Atrial Rate 63 BPM    FL Interval 116 ms    QRSD Interval 92 ms    QT Interval 382 ms    QTC Interval 390 ms    P Axis 77 degrees    QRS Axis 91 degrees    T Wave Axis 64 degrees   CBC and differential    Collection Time: 06/25/23  6:12 AM   Result Value Ref Range    WBC 6.45 4.31 - 10.16 Thousand/uL    RBC 5.11 3.88 - 5.62 Million/uL    Hemoglobin 15.6 12.0 - 17.0 g/dL    Hematocrit 45.5 36.5 - 49.3 %    MCV 89 82 - 98 fL    MCH 30.5 26.8 - 34.3 pg    MCHC 34.3 31.4 - 37.4 g/dL    RDW 11.9 11.6 - 15.1 %    MPV 9.6 8.9 - 12.7 fL    Platelets 865 043 - 418 Thousands/uL    nRBC 0 /100 WBCs    Neutrophils Relative 66 43 - 75 %    Immat GRANS % 0 0 - 2 %    Lymphocytes Relative 25 14 - 44 %    Monocytes Relative 8 4 - 12 %    Eosinophils Relative 1 0 - 6 %    Basophils Relative 0 0 - 1 %    Neutrophils Absolute 4.24 1.85 - 7.62 Thousands/µL    Immature Grans Absolute 0.02 0.00 - 0.20 Thousand/uL    Lymphocytes Absolute 1.58 0.60 - 4.47 Thousands/µL    Monocytes Absolute 0.52 0.17 - 1.22 Thousand/µL    Eosinophils Absolute 0.07 0.00 - 0.61 Thousand/µL    Basophils Absolute 0.02 0.00 - 0.10 Thousands/µL   Urinalysis    Collection Time: 06/25/23  6:38 AM   Result Value Ref Range    Clarity, UA Clear Clear, Other    Color, UA Yellow Straw, Yellow, Pale Yellow    Specific Gravity, UA 1.020 1.003 - 1.040    pH, UA 6.0 4.5, 5.0, 5.5, 6.0, 6. 5, 7.0, 7.5, 8.0    Glucose, UA Negative Negative mg/dl    Ketones, UA Negative Negative mg/dl    Occult Blood, UA Negative Negative    Protein, UA Negative Negative mg/dl    Nitrite, UA Negative Negative    Bilirubin, UA Negative Negative    Leukocytes, UA Negative Negative    WBC, UA 0-1 (A) None Seen, 2-4, 5-60 /hpf    RBC, UA 0-1 (A) None Seen, 2-4 /hpf    Bacteria, UA None Seen None Seen, Occasional /hpf    Epithelial Cells None Seen None Seen, Occasional /hpf    UROBILINOGEN UA Negative 1.0, Negative mg/dL   Fingerstick Glucose (POCT)    Collection Time: 06/25/23  7:35 AM   Result Value Ref Range    POC Glucose 97 65 - 140 mg/dl   ECG 12 lead    Collection Time: 06/25/23  7:38 AM   Result Value Ref Range    Ventricular Rate 73 BPM    Atrial Rate 73 BPM    AZ Interval 106 ms    QRSD Interval 88 ms    QT Interval 372 ms    QTC Interval 409 ms    P Axis 96 degrees    QRS Axis 91 degrees    T Wave Axis 66 degrees   ECG 12 lead    Collection Time: 06/25/23  7:43 AM   Result Value Ref Range    Ventricular Rate 68 BPM    Atrial Rate 68 BPM    AZ Interval 106 ms    QRSD Interval 86 ms    QT Interval 374 ms    QTC Interval 397 ms    P [place] 95 degrees    QRS Axis 89 degrees    T Wave Axis 62 degrees   ECG 12 lead    Collection Time: 06/25/23  7:46 AM   Result Value Ref Range    Ventricular Rate 66 BPM    Atrial Rate 66 BPM    AZ Interval 110 ms    QRSD Interval 94 ms    QT Interval 374 ms    QTC Interval 392 ms    P Axis 81 degrees    QRS Axis 92 degrees    T Wave Axis 68 degrees         Mental Status Evaluation:    Appearance:  age appropriate, casually dressed, adequate grooming, looks stated age, underweight, sitting comfortably in chair   Behavior:  guarded, superficial, adequate eye contact   Speech:  normal rate and volume, coherent   Mood:  "normal"   Affect:  constricted, reactive at times   Thought Process:  goal directed, linear   Associations: intact associations   Thought Content:  no overt delusions Perceptual Disturbances: Denies auditory or visual hallucinations and Does not appear to be responding to internal stimuli   Risk Potential: Suicidal ideation - None at present  Homicidal ideation - None at present  Potential for aggression - Not at present   Sensorium:  oriented to person, place and situation   Memory:  recent and remote memory grossly intact   Consciousness:  alert and awake   Attention/Concentration: attention span and concentration are age appropriate   Insight:  limited   Judgment: limited   Gait/Station: normal gait/station   Motor Activity: no abnormal movements       Current Medications:  Current Facility-Administered Medications   Medication Dose Route Frequency Provider Last Rate   • acetaminophen  650 mg Oral Q6H PRN Xuan More MD     • acetaminophen  650 mg Oral Q4H PRN Xuan More MD     • acetaminophen  975 mg Oral Q6H PRN Xuan More MD     • aluminum-magnesium hydroxide-simethicone  30 mL Oral Q4H PRN Xuan Moer MD     • haloperidol lactate  2.5 mg Intramuscular Q4H PRN Max 4/day Xuan More MD      And   • LORazepam  1 mg Intramuscular Q4H PRN Max 4/day Xuan More MD      And   • benztropine  0.5 mg Intramuscular Q4H PRN Max 4/day Xuan More MD     • haloperidol lactate  5 mg Intramuscular Q4H PRN Max 4/day Xuan More MD      And   • LORazepam  2 mg Intramuscular Q4H PRN Max 4/day Xuan More MD      And   • benztropine  1 mg Intramuscular Q4H PRN Max 4/day Xuan More MD     • benztropine  1 mg Oral Q4H PRN Max 6/day Xuan More MD     • bisacodyl  10 mg Rectal Daily PRN Xuan More MD     • hydrOXYzine HCL  50 mg Oral Q6H PRN Max 4/day Xuan More MD      Or   • diphenhydrAMINE  50 mg Intramuscular Q6H PRN Xuan More MD     • escitalopram  5 mg Oral Daily Halina Leal MD     • haloperidol  1 mg Oral Q6H PRN Xuan More MD     • haloperidol  2.5 mg Oral Q4H PRN Max 4/day Claudia VILLAFANA Yasmin Kaufman MD     • haloperidol  5 mg Oral Q4H PRN Max 4/day Micheline Mccormick MD     • hydrOXYzine HCL  100 mg Oral Q6H PRN Max 4/day Micheline Mccormick MD      Or   • LORazepam  2 mg Intramuscular Q6H PRN Micheline Mccormick MD     • hydrOXYzine HCL  25 mg Oral Q6H PRN Max 4/day Micheline Mccormick MD     • melatonin  3 mg Oral HS Micheline Mccormick MD     • nicotine polacrilex  4 mg Oral Q2H PRN Micheline Mccormick MD     • phenol  1 spray Mouth/Throat Q2H PRN Nishant Ferrell PA-C     • polyethylene glycol  17 g Oral Daily PRN Micheline Mccormick MD     • senna-docusate sodium  1 tablet Oral Daily PRN Micheline Mccormick MD     • traZODone  50 mg Oral HS PRN MD Rayo Montemayor 06/26/23  Medical Student IV    This note was completed in part utilizing [employer] Direct Software. Grammatical, translation, syntax errors, random word insertions, spelling mistakes, and incomplete sentences may be an occasional consequence of this system secondary to software limitations with voice recognition, ambient noise, and hardware issues. If you have any questions or concerns about the content, text, or information contained within the body of this dictation, please contact the provider for clarification.

## 2023-06-26 NOTE — TREATMENT TEAM
06/26/23 1300   Activity/Group Checklist   Group   (recovery group)   Attendance Attended   Attendance Duration (min) 46-60   Interactions Interacted appropriately   Affect/Mood Appropriate   Goals Achieved Discussed coping strategies; Discussed self-esteem issues; Able to listen to others; Able to engage in interactions     Patient refused to share his thoughts about group even though he appeared impacted.

## 2023-06-26 NOTE — PROGRESS NOTES
06/26/23 1513   Team Meeting   Meeting Type Tx Team Meeting   Initial Conference Date 06/26/23   Team Members Present   Team Members Present Physician;Nurse;   Physician Team Member Christi Pham   Nursing Team Member [facility] Team Member Jackson   Patient/Family Present   Patient Present Yes   Patient's Family Present No     Tx plan was reviewed and discussed with Pt. Pt was encouraged to attend groups. Medication was discussed with Pt. Pt signed tx plan.

## 2023-06-26 NOTE — NURSING NOTE
Pt denies SI/HI/AH/VH. Medication and meal compliant. Scant/guarded with communication. Present in milieu. Pt is calm and pleasant during communication. Pt remained mostly isolative to room. Pt appears depressed. Will continue to monitor.

## 2023-06-26 NOTE — PROGRESS NOTES
06/26/23 0839   Team Meeting   Meeting Type Daily Rounds   Team Members Present   Team Members Present Physician;Nurse;   Physician Team Member [facility]   Nursing Team Member [facility] Team Member Jackson   Patient/Family Present   Patient Present No   Patient's Family Present No   Readmit score 18. Pt is a new admit here on a 201 from [facility] ICU following OD on Benadryl. Pt with superficial lacerations on forearms. Pt reporting significant relationship issues. Plan to return to [facility] upon discharge. Pt c/o sore throat, received throat lozenges. Discharge to be determined.

## 2023-06-26 NOTE — NURSING NOTE
Patient has remained mostly in his room this evening. He is depressed and constricted although pleasant and appropriate in interaction. He again refused the scheduled HS Melatonin and added that although he did not sleep very well last night he would rather not take anything for sleep.   He denies S.I.H.I A/HV/H

## 2023-06-26 NOTE — TREATMENT TEAM
06/26/23 1000   Activity/Group Checklist   Group Community meeting   Attendance Attended   Attendance Duration (min) 16-30   Interactions Interacted appropriately   Affect/Mood Appropriate   Goals Achieved Able to listen to others; Able to engage in interactions; Able to self-disclose; Able to recieve feedback

## 2023-06-26 NOTE — SOCIAL WORK
Patient Intake   Living Arrangement Aunt's home   Can patient return home yes   Address to discharge to [address] [address], [place], [address]   Patient's Telephone Number [phone]   Patient's e-mail Address [email or web address]   Access to firearms Yes, in [place] in a safe   Type of work Mortician   School grade/year College graduate   Marital Status/Children , 1yo (wife is caring for)   Spirituality/Judaism None   Transportation [employer]   Preferred Pharmacy none   Admission Status    Status of admission [address] of [address] [place]   Patient History   Stressor/Trigger SA by OD on Benadryl. Hopelessness, family stress, marital problems, job stress. Treatment History None   Current psychiatrist/therapist None   Suicide Attempts OD on Benadryl PTA   Family History of Mental Health denies   ACT/ICM None, declined referral   Legal Issues Denies   Substance Abuse UDS: negative  Audit Score: 1  Nicotine/Tobacco: pt states he smokes 1 cigarette per week.       Trauma/Losses Denies       Releases of Information  Declined all VERNA's

## 2023-06-26 NOTE — PROGRESS NOTES
Met with patient completed his admission self assessment. He states he is going to school in [facility]. 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 He states there are a lot of stressors including arguing with his wife and his parents. 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 He has many activities he enjoys. Patient knows for discharge that he needs to stop feeling like he wants to hurt himself or others. He needs to begin to live on life terms, taking responsibility. He needs to become more assertive and improve his sense of self.

## 2023-06-27 PROCEDURE — 99232 SBSQ HOSP IP/OBS MODERATE 35: CPT | Performed by: PSYCHIATRY & NEUROLOGY

## 2023-06-27 RX ORDER — ESCITALOPRAM OXALATE 5 MG/1
5 TABLET ORAL DAILY
Status: CANCELLED | OUTPATIENT
Start: 2023-06-28

## 2023-06-27 RX ORDER — LAMOTRIGINE 25 MG/1
25 TABLET ORAL DAILY
Status: CANCELLED | OUTPATIENT
Start: 2023-06-27

## 2023-06-27 RX ADMIN — ESCITALOPRAM 5 MG: 5 TABLET, FILM COATED ORAL at 08:13

## 2023-06-27 RX ADMIN — Medication 3 MG: at 21:21

## 2023-06-27 NOTE — PROGRESS NOTES
Progress Note - [address] [facility] 24 y.o. male MRN: [id number]  Unit/Bed#: U 342-02 Encounter: [id number]    Assessment/Plan   Principal Problem:    Current severe episode of major depressive disorder without prior episode ([address])  Active Problems:    Anticholinergic drug overdose    Prolonged Q-T interval on ECG    Severe protein-calorie malnutrition (HCC)    Tobacco use    Medical clearance for psychiatric admission    Anxiety disorder, unspecified      Recommended Treatment:   Continue Escitalopram 5 mg daily for depression and anxiety  Start Lamotrigine 25 mg daily for mood lability    Continue with pharmacotherapy, group therapy, milieu therapy and occupational therapy. Continue to assess for adverse medication side effects. Encourage Yazmin Myles to participate in nonverbal forms of therapy including journaling and art/music therapy. Continue frequent safety checks and vitals per unit protocol. Continue to engage CM/SW to assist with collateral, disposition planning, and the implementation of an individualized, patient-centered plan of care. Continue medical management by medical team.  Case discussed with treatment team.    Legal Status: 201  ------------------------------------------------------------    Subjective: All documentation including nursing notes, medication history to ensure medication adherence on the unit, labs, and vitals were reviewed. Shashank Villalta was evaluated this morning for continuity of care and no acute distress noted throughout the evaluation. Over the past 24 hours per nursing report, Shashank Villalta has been cooperative on the unit and compliant with medications. Per  note, patient refused to share his thoughts about group therapy. Per nursing report, patient has been scant/guarded in communication, appears depressed and is mostly isolative to room. Today, Shashank Villalta is consenting for safety on the unit.  Shashank Villalta reports feeling "normal." Brendan Marley notes having "good" sleep. Brendan Marley states having a "good" appetite. Brendan Marley has been taking the medications as prescribed and reporting no side effects. Patient was seen and examined today in patient's room. Patient was initially guarded and scant in responses, but became more open as interview progressed. We discussed how he currently feels about the stressors that precipitated his suicide attempt. Patient states that he feels "good" about the stressors and has had conversations with both his family and his wife and that both parties will "stop doing the things that stressed me out". Patient was asked if he had a history of irritability/mood lability. Patient confirms this and states that it primarily is in response to his wife. Patient states that he previously would "drive my car fast" or "see girls" when his wife made him angry. Patient denies periods of elated mood or increase in goal oriented activity. Patient was advised to seek a different marriage counselor when he gets discharged. Patient was also counseled on the possible benefits of lamotrigine for mood stability, and patient is amenable to start the medication. Brendan Marley denies suicidal ideations. Brendan Marley denies homicidal ideations. Regarding hallucinations, Brendan Marley denies AH/VH. PRNs overnight: None   VS: Reviewed, within normal limits    Progress Toward Goals: slow improvement    Psychiatric Review of Systems:  Behavior over the last 24 hours:  improved  Sleep: normal  Appetite: normal  Medication side effects: No   ROS: no complaints    Vital signs in last 24 hours:  Temp:  [97.8 °F (36.6 °C)-98.1 °F (36.7 °C)] 98.1 °F (36.7 °C)  HR:  [68-80] 68  Resp:  [16] 16  BP: (111-118)/(56-64) 111/64    Laboratory results:  I have personally reviewed all pertinent laboratory/tests results. No results found for this or any previous visit (from the past 48 hour(s)).       Mental Status Evaluation:    Appearance:  marginal hygiene, underweight, Laying in bed with blanket pulled over his body   Behavior:  calm, guarded, later becoming more open, adequate eye contact   Speech:  normal rate, normal volume, scant   Mood:  "normal"   Affect:  more reactive, brighter, superficial at times, still somewhat constricted   Thought Process:  goal directed, linear   Associations: intact associations   Thought Content:  no overt delusions   Perceptual Disturbances: Denies auditory or visual hallucinations and Does not appear to be responding to internal stimuli   Risk Potential: Suicidal ideation - None at present  Homicidal ideation - None at present  Potential for aggression - Not at present   Sensorium:  oriented to person, place and situation   Memory:  recent and remote memory grossly intact   Consciousness:  alert and awake   Attention/Concentration: attention span and concentration are age appropriate   Insight:  improving and partial   Judgment: improving and partial   Gait/Station: normal gait/station   Motor Activity: no abnormal movements       Current Medications:  Current Facility-Administered Medications   Medication Dose Route Frequency Provider Last Rate   • acetaminophen  650 mg Oral Q6H PRN Chris Ennis MD     • acetaminophen  650 mg Oral Q4H PRN Chris Ennis MD     • acetaminophen  975 mg Oral Q6H PRN Chris Ennis MD     • aluminum-magnesium hydroxide-simethicone  30 mL Oral Q4H PRN Chris Ennis MD     • haloperidol lactate  2.5 mg Intramuscular Q4H PRN Max 4/day Chris Ennis MD      And   • LORazepam  1 mg Intramuscular Q4H PRN Max 4/day Chris Ennis MD      And   • benztropine  0.5 mg Intramuscular Q4H PRN Max 4/day Chris Ennis MD     • haloperidol lactate  5 mg Intramuscular Q4H PRN Max 4/day Chris Ennis MD      And   • LORazepam  2 mg Intramuscular Q4H PRN Max 4/day Chris Ennis MD      And   • benztropine  1 mg Intramuscular Q4H PRN Max 4/day Chris Ennis MD     • benztropine  1 mg Oral Q4H PRN Max 6/day Marshall Hagen MD     • bisacodyl  10 mg Rectal Daily PRN Marshall Hgaen MD     • hydrOXYzine HCL  50 mg Oral Q6H PRN Max 4/day Marshall Hagen MD      Or   • diphenhydrAMINE  50 mg Intramuscular Q6H PRN Marshall Hagen MD     • escitalopram  5 mg Oral Daily Cortney Keene MD     • haloperidol  1 mg Oral Q6H PRN Marshall Hagen MD     • haloperidol  2.5 mg Oral Q4H PRN Max 4/day Marshall Hagen MD     • haloperidol  5 mg Oral Q4H PRN Max 4/day Marshall Hagen MD     • hydrOXYzine HCL  100 mg Oral Q6H PRN Max 4/day Marshall Hagen MD      Or   • LORazepam  2 mg Intramuscular Q6H PRN Marshall Hagen MD     • hydrOXYzine HCL  25 mg Oral Q6H PRN Max 4/day Marshall Hagen MD     • melatonin  3 mg Oral HS Marshall Hagen MD     • nicotine polacrilex  4 mg Oral Q2H PRN Marshall Hagen MD     • phenol  1 spray Mouth/Throat Q2H PRN Elizabeth Savage PA-C     • polyethylene glycol  17 g Oral Daily PRN Marshall Hagen MD     • senna-docusate sodium  1 tablet Oral Daily PRN Marshall Hagen MD     • traZODone  50 mg Oral HS PRN Thomasene Dauphin, MD Bonnee Dubin 06/27/23  Medical Student IV    This note was completed in part utilizing [employer] Software. Grammatical, translation, syntax errors, random word insertions, spelling mistakes, and incomplete sentences may be an occasional consequence of this system secondary to software limitations with voice recognition, ambient noise, and hardware issues. If you have any questions or concerns about the content, text, or information contained within the body of this dictation, please contact the provider for clarification.

## 2023-06-27 NOTE — TREATMENT TEAM
06/27/23 1300   Activity/Group Checklist   Group   (recovery group)   Attendance Attended  (left early)   Attendance Duration (min) 31-45   Interactions Did not interact   Affect/Mood Appropriate   Goals Achieved Able to listen to others; Able to engage in interactions; Discussed self-esteem issues; Discussed coping strategies

## 2023-06-27 NOTE — NURSING NOTE
Patient isolated to room most of shift. Denies psych symptoms of SI, HI, A/V hallucinations although appears depressed and withdrawn. Flat affect noted. Pleasant on approach. Patient encouraged to attend group and reminded of unit routines. C/o 3/10 back pain and declined prn medication.

## 2023-06-27 NOTE — NURSING NOTE
Patient visible on unit for snack and needs. Otherwise, mostly in his room. Pleasant upon approach. Refused scheduled HS medication of Melatonin " I don't need that." Denied any unmet needs.

## 2023-06-27 NOTE — PROGRESS NOTES
06/27/23 0835   Team Meeting   Meeting Type Daily Rounds   Team Members Present   Team Members Present Physician;Nurse;   Physician Team Member [id number] [facility] Team Member [facility]   Care Management Team Member [place]   Patient/Family Present   Patient Present No   Patient's Family Present No     Pt remains mostly isolative to room. Appears depressed. Denying SI/HI/AVH. Pt attending some groups. Pt pleasant and cooperative. Discharge to be determined.

## 2023-06-28 LAB
ATRIAL RATE: 144 BPM
QRS AXIS: 96 DEGREES
QRSD INTERVAL: 94 MS
QT INTERVAL: 362 MS
QTC INTERVAL: 554 MS
T WAVE AXIS: 52 DEGREES
VENTRICULAR RATE: 141 BPM

## 2023-06-28 PROCEDURE — 99232 SBSQ HOSP IP/OBS MODERATE 35: CPT | Performed by: STUDENT IN AN ORGANIZED HEALTH CARE EDUCATION/TRAINING PROGRAM

## 2023-06-28 PROCEDURE — 93010 ELECTROCARDIOGRAM REPORT: CPT | Performed by: INTERNAL MEDICINE

## 2023-06-28 RX ORDER — LAMOTRIGINE 25 MG/1
25 TABLET ORAL DAILY
Status: DISCONTINUED | OUTPATIENT
Start: 2023-06-28 | End: 2023-06-30 | Stop reason: HOSPADM

## 2023-06-28 RX ORDER — LANOLIN ALCOHOL/MO/W.PET/CERES
3 CREAM (GRAM) TOPICAL
Status: DISCONTINUED | OUTPATIENT
Start: 2023-06-28 | End: 2023-06-30 | Stop reason: HOSPADM

## 2023-06-28 RX ORDER — ESCITALOPRAM OXALATE 10 MG/1
10 TABLET ORAL DAILY
Status: DISCONTINUED | OUTPATIENT
Start: 2023-06-29 | End: 2023-06-30 | Stop reason: HOSPADM

## 2023-06-28 RX ADMIN — ESCITALOPRAM 5 MG: 5 TABLET, FILM COATED ORAL at 08:12

## 2023-06-28 RX ADMIN — LAMOTRIGINE 25 MG: 25 TABLET ORAL at 13:18

## 2023-06-28 NOTE — NURSING NOTE
At the start of the shift pt approached nursing station and requested to sign a 72-hour notice. RN educated pt on possible outcomes, but supported pt and their right to sign if that is what they decided. Pt decided  discuss with doctor in the morning and opted not to sign at this time. Pt out of their room most of the evening shift, isolative to self, visible using the pt telephone. Calm and cooperative. Denied depression, anxiety, SI, HI, and hallucinations of any kind. Pt reported 3/10 pain and refused any PRN medication. Compliant with scheduled night time medications. Able to verbalize their needs and denies any unmet needs.

## 2023-06-28 NOTE — NURSING NOTE
Pt visible on unit, withdrawn to self. Guarded/scant in conversation. Denying psych symptoms. Encouraged to attend groups but declined. Prefers to be on the phone for long periods of time. Medication and meal compliant. Denies any unmet needs or complaints at this time.

## 2023-06-28 NOTE — PLAN OF CARE
Problem: SELF HARM/SUICIDALITY  Goal: Will have no self-injury during hospital stay  Description: INTERVENTIONS:  - Q 15 MINUTES: Routine safety checks  - Q WAKING SHIFT & PRN: Assess risk to determine if routine checks are adequate to maintain patient safety  - Encourage patient to participate actively in care by formulating a plan to combat response to suicidal ideation, identify supports and resources  Outcome: Progressing     Problem: DEPRESSION  Goal: Will be euthymic at discharge  Description: INTERVENTIONS:  - Administer medication as ordered  - Provide emotional support via 1:1 interaction with staff  - Encourage involvement in milieu/groups/activities  - Monitor for social isolation  Outcome: Progressing     Problem: ANXIETY  Goal: Will report anxiety at manageable levels  Description: INTERVENTIONS:  - Administer medication as ordered  - Teach and encourage coping skills  - Provide emotional support  - Assess patient/family for anxiety and ability to cope  Outcome: Progressing  Goal: By discharge: Patient will verbalize 2 strategies to deal with anxiety  Description: Interventions:  - Identify any obvious source/trigger to anxiety  - Staff will assist patient in applying identified coping technique/skills  - Encourage attendance of scheduled groups and activities  Outcome: Progressing     Problem: SLEEP DISTURBANCE  Goal: Will exhibit normal sleeping pattern  Description: Interventions:  -  Assess the patients sleep pattern, noting recent changes  - Administer medication as ordered  - Decrease environmental stimuli, including noise, as appropriate during the night  - Encourage the patient to actively participate in unit groups and or exercise during the day to enhance ability to achieve adequate sleep at night  - Assess the patient, in the morning, encouraging a description of sleep experience  Outcome: Progressing     Problem: DISCHARGE PLANNING - CARE MANAGEMENT  Goal: Discharge to post-acute care or home with appropriate resources  Description: INTERVENTIONS:  - Conduct assessment to determine patient/family and health care team treatment goals, and need for post-acute services based on payer coverage, community resources, and patient preferences, and barriers to discharge  - Address psychosocial, clinical, and financial barriers to discharge as identified in assessment in conjunction with the patient/family and health care team  - Arrange appropriate level of post-acute services according to patient’s   needs and preference and payer coverage in collaboration with the physician and health care team  - Communicate with and update the patient/family, physician, and health care team regarding progress on the discharge plan  - Arrange appropriate transportation to post-acute venues  Outcome: Progressing

## 2023-06-28 NOTE — PROGRESS NOTES
06/28/23 0837   Team Meeting   Meeting Type Daily Rounds   Team Members Present   Team Members Present Physician;;Nurse   Physician Team Member [place]   Nursing Team Member [facility] Management Team Member [place]   Patient/Family Present   Patient Present No   Patient's Family Present No     Denies all. Pt isolative to self, but visible and social on the telephone. Pt does not appear remorseful over suicide attempt. Pt attends group, but does not participate. Med/meal compliant. Poss dc end of this week, possible next week.

## 2023-06-28 NOTE — PROGRESS NOTES
Progress Note - [address] [facility] 24 y.o. male MRN: [id number]  Unit/Bed#: [facility] 342-01 Encounter: [id number]    Assessment/Plan   Principal Problem:    Current severe episode of major depressive disorder without prior episode ([address])  Active Problems:    Anticholinergic drug overdose    Prolonged Q-T interval on ECG    Severe protein-calorie malnutrition (HCC)    Tobacco use    Medical clearance for psychiatric admission    Anxiety disorder, unspecified    Recommended Treatment:   Increase Lexapro to 10 mg daily for depression and anxiety. Lamcital 25mg daily for mood stabilization. All current active medications have been reviewed  Encourage group therapy, milieu therapy and occupational therapy  Behavioral Health checks every 7 minutes  Medical management per SLIM. Commitment Status: 201  ----------------------------------------      Subjective: Patient discussed in nursing report and overnight notes and charting reviewed. Per nursing report, he has been attending some group and milieu activities. Limited interactions with peers. He has been connecting with select staff members though. He reports that his mood today is "normal". His affect is constricted through most of the encounter although he does brighten appropriately and is reactive when discussing his family and relationships. He states that he continues to be in constant communication with his wife and parents. He has been calling and talking to them on the phone on a near daily basis. He finds that they are a good support for him and feels that they will be able to help him post discharge. He states that anxiety and stress has been reduced given that father is making modifications to his work schedule as well as helping assist with pay off some debts that he has. He feels that he has been sleeping well, having no major difficulties initiating or maintaining sleep. Feeling well rested in the morning.   Appetite levels are stable and adequate, he has been eating 3 times per day and feels as though he is satiated. Reports that energy levels are fair. He is tolerating the Lexapro well, feels he has not had any negative side effects and feels that his depression and anxiety are gradually improving. He continues to adamantly deny any suicidal or homicidal ideations. Denies any auditory or visual hallucinations. No issues with irritability or impulsive actions during admission. We discussed the relationship discord and issues that he has been having with his wife, described various coping mechanisms that he can use post discharge if he does become upset or frustrated. He is hoping for discharge later in the week, agreeable to sign release of information for his parents and on to facilitate communication.       Behavior over the last 24 hours:  unchanged  Sleep: normal  Appetite: normal  Medication side effects: No  ROS: no complaints and all other systems are negative    Mental Status Evaluation:  Appearance:  casually dressed, adequate grooming   Behavior:  pleasant, cooperative   Speech:  normal rate and volume   Mood:  "normal"   Affect:  constricted, slightly brighter   Thought Process:  organized, logical, coherent, goal directed   Associations: intact associations   Thought Content:  no overt delusions   Perceptual Disturbances: no auditory hallucinations, no visual hallucinations, does not appear responding to internal stimuli   Risk Potential: Suicidal ideation - None at present  Homicidal ideation - None at present  Potential for aggression - No   Sensorium:  oriented to person, place and time/date   Memory:  recent and remote memory grossly intact   Consciousness:  alert and awake   Attention/Concentration: attention span and concentration are age appropriate   Insight:  fair   Judgment: fair and improving   Gait/Station: normal gait/station, normal balance   Motor Activity: no abnormal movements     Medications: all current active meds have been reviewed.   Current Facility-Administered Medications   Medication Dose Route Frequency Provider Last Rate   • acetaminophen  650 mg Oral Q6H PRN Brandi Dupree MD     • acetaminophen  650 mg Oral Q4H PRN Brandi Dupree MD     • acetaminophen  975 mg Oral Q6H PRN Brandi Dupree MD     • aluminum-magnesium hydroxide-simethicone  30 mL Oral Q4H PRN Brandi Dupree MD     • haloperidol lactate  2.5 mg Intramuscular Q4H PRN Max 4/day Brandi Dupree MD      And   • LORazepam  1 mg Intramuscular Q4H PRN Max 4/day Brandi Dupree MD      And   • benztropine  0.5 mg Intramuscular Q4H PRN Max 4/day Brandi Dupree MD     • haloperidol lactate  5 mg Intramuscular Q4H PRN Max 4/day Brandi Dupree MD      And   • LORazepam  2 mg Intramuscular Q4H PRN Max 4/day Brandi Dupree MD      And   • benztropine  1 mg Intramuscular Q4H PRN Max 4/day Brandi Dupree MD     • benztropine  1 mg Oral Q4H PRN Max 6/day Brandi Dupree MD     • bisacodyl  10 mg Rectal Daily PRN Brandi Dupree MD     • hydrOXYzine HCL  50 mg Oral Q6H PRN Max 4/day Brandi Dupree MD      Or   • diphenhydrAMINE  50 mg Intramuscular Q6H PRN Brandi Dupree MD     • [START ON 6/29/2023] escitalopram  10 mg Oral Daily Cassi Bonilla MD     • haloperidol  1 mg Oral Q6H PRN Brandi Dupree MD     • haloperidol  2.5 mg Oral Q4H PRN Max 4/day Brandi Dupree MD     • haloperidol  5 mg Oral Q4H PRN Max 4/day Brandi Dupree MD     • hydrOXYzine HCL  100 mg Oral Q6H PRN Max 4/day Brandi Dupree MD      Or   • LORazepam  2 mg Intramuscular Q6H PRN Brandi Dupree MD     • hydrOXYzine HCL  25 mg Oral Q6H PRN Max 4/day Brandi Dupree MD     • lamoTRIgine  25 mg Oral Daily Cassi Bonilla MD     • melatonin  3 mg Oral HS PRN Cassi Bonilla MD     • nicotine polacrilex  4 mg Oral Q2H PRN Brandi Dupree MD     • phenol  1 spray Mouth/Throat Q2H PRN Crow Rosado PA-C     • polyethylene glycol  17 g Oral Daily PRN Araseli Alcantara MD     • senna-docusate sodium  1 tablet Oral Daily PRN Araseli Alcantara MD     • traZODone  50 mg Oral HS PRN Araseli Alcantara MD         Labs: I have personally reviewed all pertinent laboratory/tests results  Most Recent Labs:     Results from the past 24 hours: No results found for this or any previous visit (from the past 24 hour(s)). Progress Toward Goals: progressing gradually    Risks / Benefits of Treatment:    Risks, benefits, and possible side effects of medications explained to patient and patient verbalizes understanding and agreement for treatment. Counseling / Coordination of Care: Total floor / unit time spent today 35 minutes. Greater than 50% of total time was spent with the patient and / or family counseling and / or coordination of care. A description of counseling / coordination of care:  Patient's progress discussed with staff in treatment team meeting. Treatment plan, treatment progress and medication changes were reviewed with nursing staff, pharmacy service, and case management in interdisciplinary treatment team meeting. Medications, treatment progress and treatment plan reviewed with patient. Recent stressors including family conflict, family issues and job stress discussed with patient. Educated on importance of medication and treatment compliance. Reassurance and supportive therapy provided. Encouraged participation in milieu and group therapy on the unit.       Diego Hernandez MD 06/28/23

## 2023-06-29 PROCEDURE — 99232 SBSQ HOSP IP/OBS MODERATE 35: CPT | Performed by: STUDENT IN AN ORGANIZED HEALTH CARE EDUCATION/TRAINING PROGRAM

## 2023-06-29 RX ADMIN — LAMOTRIGINE 25 MG: 25 TABLET ORAL at 08:01

## 2023-06-29 RX ADMIN — ESCITALOPRAM OXALATE 10 MG: 10 TABLET ORAL at 08:01

## 2023-06-29 NOTE — NURSING NOTE
Pt is uninterested in groups. Denies Psych symptoms and states he slept well. Medications and meal compliant. Focused on D/C. On phones frequently throughout day. Denies any unmet needs or complaints at this time.

## 2023-06-29 NOTE — PROGRESS NOTES
06/29/23 0841   Team Meeting   Meeting Type Daily Rounds   Team Members Present   Team Members Present Physician;Nurse;   Physician Team Member Nayeli Sheldon   Nursing Team Member [facility] Management Team Member [place]   Patient/Family Present   Patient Present No   Patient's Family Present No     Pt is med/meal compliant. Focused on MHT, wrote letter to MHT and required redirection. Discharge pending for tomorrow.

## 2023-06-29 NOTE — PROGRESS NOTES
Progress Note - [address] [facility] 24 y.o. male MRN: [id number]  Unit/Bed#: [facility] 342-01 Encounter: [id number]    Assessment/Plan   Principal Problem:    Current severe episode of major depressive disorder without prior episode ([address])  Active Problems:    Anticholinergic drug overdose    Prolonged Q-T interval on ECG    Severe protein-calorie malnutrition (HCC)    Tobacco use    Medical clearance for psychiatric admission    Anxiety disorder, unspecified    Recommended Treatment:   Lexapro to 10 mg daily for depression and anxiety. Lamcital 25mg daily for mood stabilization. All current active medications have been reviewed  Encourage group therapy, milieu therapy and occupational therapy  Behavioral Health checks every 7 minutes  Medical management per SLIM. Commitment Status: 201  ----------------------------------------      Subjective: Patient discussed in nursing report and overnight notes and charting reviewed. Per nursing report, patient noted to be talking to family throughout the phone last night. Cooperative and pleasant. Wrote a note describing his adoration towards a staff member. Reports today that his mood is "happy". His affect is brighter and more reactive. He states that he shared a note he had written yesterday with a staff member. Limits were reinforced and advised to maintain boundaries while on the unit. He was accepting of this. He states that he is feeling better overall, more future oriented and goal directed. Looking forward to spending time with his family post discharge and getting back to [facility]. He feels that the stressors from his work life will be less and which is relieving to him. We discussed his relationship with his wife, he feels ambivalent about continuing the relationship with her and mentions seeing other women in the future. He is adamantly denying any suicidal or homicidal ideations. Denies any auditory or visual hallucinations.   He is tolerating the medications well, no side effects reported. Specifically no rash with the recently started Lamictal.    Behavior over the last 24 hours:  unchanged  Sleep: normal  Appetite: normal  Medication side effects: No  ROS: no complaints and all other systems are negative    Mental Status Evaluation:  Appearance:  casually dressed, adequate grooming   Behavior:  cooperative, calm   Speech:  normal rate and volume   Mood:  "happy"   Affect:  brighter   Thought Process:  organized, logical, coherent, goal directed   Associations: intact associations   Thought Content:  normal, no overt delusions   Perceptual Disturbances: no auditory hallucinations, no visual hallucinations   Risk Potential: Suicidal ideation - None at present  Homicidal ideation - None at present  Potential for aggression - No   Sensorium:  oriented to person, place and time/date   Memory:  recent and remote memory grossly intact   Consciousness:  alert and awake   Attention/Concentration: attention span and concentration are age appropriate   Insight:  fair   Judgment: fair   Gait/Station: normal gait/station   Motor Activity: no abnormal movements     Medications: all current active meds have been reviewed and continue current psychiatric medications.   Current Facility-Administered Medications   Medication Dose Route Frequency Provider Last Rate   • acetaminophen  650 mg Oral Q6H PRN Vernon Pacheco MD     • acetaminophen  650 mg Oral Q4H PRN Vernon Pacheco MD     • acetaminophen  975 mg Oral Q6H PRN Vernon Pacheco MD     • aluminum-magnesium hydroxide-simethicone  30 mL Oral Q4H PRN Vernon Pacheco MD     • haloperidol lactate  2.5 mg Intramuscular Q4H PRN Max 4/day Vernon Pacheco MD      And   • LORazepam  1 mg Intramuscular Q4H PRN Max 4/day Vernon Pacheco MD      And   • benztropine  0.5 mg Intramuscular Q4H PRN Max 4/day Vernon Pacheco MD     • haloperidol lactate  5 mg Intramuscular Q4H PRN Max 4/day Claudia VILLAFANA Yasmin Kaufman MD      And   • LORazepam  2 mg Intramuscular Q4H PRN Max 4/day Micheline Mccormick MD      And   • benztropine  1 mg Intramuscular Q4H PRN Max 4/day Micheline Mccormick MD     • benztropine  1 mg Oral Q4H PRN Max 6/day Micheline Mccormick MD     • bisacodyl  10 mg Rectal Daily PRN Micheline Mccormick MD     • hydrOXYzine HCL  50 mg Oral Q6H PRN Max 4/day Micheline Mccormick MD      Or   • diphenhydrAMINE  50 mg Intramuscular Q6H PRN Micheline Mccormick MD     • escitalopram  10 mg Oral Daily Matt Tipton MD     • haloperidol  1 mg Oral Q6H PRN Micheline Mccormick MD     • haloperidol  2.5 mg Oral Q4H PRN Max 4/day Micheline Mccormick MD     • haloperidol  5 mg Oral Q4H PRN Max 4/day Micheline Mccormick MD     • hydrOXYzine HCL  100 mg Oral Q6H PRN Max 4/day Micheline Mccormick MD      Or   • LORazepam  2 mg Intramuscular Q6H PRN Micheline Mccormick MD     • hydrOXYzine HCL  25 mg Oral Q6H PRN Max 4/day Micheline Mccormick MD     • lamoTRIgine  25 mg Oral Daily Matt Tipton MD     • melatonin  3 mg Oral HS PRN Matt Tipton MD     • nicotine polacrilex  4 mg Oral Q2H PRN Micheline Mccormick MD     • phenol  1 spray Mouth/Throat Q2H PRN Nishant Ferrell PA-C     • polyethylene glycol  17 g Oral Daily PRN Micheline Mccormick MD     • senna-docusate sodium  1 tablet Oral Daily PRN Micheline Mccormick MD     • traZODone  50 mg Oral HS PRN Micheline Mccormick MD         Labs:  I have personally reviewed all pertinent laboratory/tests results  Most Recent Labs:     Most Recent Labs:   Lab Results   Component Value Date    WBC 6.45 06/25/2023    RBC 5.11 06/25/2023    HGB 15.6 06/25/2023    HCT 45.5 06/25/2023     06/25/2023    RDW 11.9 06/25/2023    NEUTROABS 4.24 06/25/2023    SODIUM 139 06/24/2023    K 4.1 06/24/2023     06/24/2023    CO2 26 06/24/2023    BUN 13 06/24/2023    CREATININE 1.01 06/24/2023    GLUC 94 06/24/2023    CALCIUM 9.5 06/24/2023    AST 17 06/24/2023    ALT 14 06/24/2023    ALKPHOS 35 06/24/2023    TP 7.2 06/24/2023    ALB 4.0 06/24/2023    TBILI 0.65 06/24/2023    CHOLESTEROL 127 06/24/2023    HDL 41 06/24/2023    TRIG 81 06/24/2023    LDLCALC 70 06/24/2023    [address] 06/24/2023    [id number] 0.878 06/24/2023       Progress Toward Goals: progressing    Risks / Benefits of Treatment:    Risks, benefits, and possible side effects of medications explained to patient and patient verbalizes understanding and agreement for treatment. Counseling / Coordination of Care: Total floor / unit time spent today 40 minutes. Greater than 50% of total time was spent with the patient and / or family counseling and / or coordination of care. A description of counseling / coordination of care:  Patient's progress discussed with staff in treatment team meeting. Treatment plan, treatment progress and medication changes were reviewed with nursing staff, pharmacy service, and case management in interdisciplinary treatment team meeting. Medications, treatment progress and treatment plan reviewed with patient. Recent stressors including family issues, relationship problems and marital problems discussed with patient. Educated on importance of medication and treatment compliance. Reassurance and supportive therapy provided. Encouraged participation in milieu and group therapy on the unit.       Cortney Keene MD 06/29/23

## 2023-06-30 VITALS
TEMPERATURE: 98.1 F | DIASTOLIC BLOOD PRESSURE: 59 MMHG | SYSTOLIC BLOOD PRESSURE: 119 MMHG | OXYGEN SATURATION: 100 % | HEIGHT: 70 IN | RESPIRATION RATE: 16 BRPM | WEIGHT: 118.4 LBS | BODY MASS INDEX: 16.95 KG/M2 | HEART RATE: 68 BPM

## 2023-06-30 PROBLEM — R94.31 PROLONGED Q-T INTERVAL ON ECG: Status: RESOLVED | Noted: 2023-06-21 | Resolved: 2023-06-30

## 2023-06-30 PROBLEM — T44.3X1A ANTICHOLINERGIC DRUG OVERDOSE: Status: RESOLVED | Noted: 2023-06-21 | Resolved: 2023-06-30

## 2023-06-30 PROCEDURE — 99239 HOSP IP/OBS DSCHRG MGMT >30: CPT | Performed by: STUDENT IN AN ORGANIZED HEALTH CARE EDUCATION/TRAINING PROGRAM

## 2023-06-30 RX ORDER — LAMOTRIGINE 25 MG/1
25 TABLET ORAL DAILY
Qty: 30 TABLET | Refills: 2 | Status: SHIPPED | OUTPATIENT
Start: 2023-06-30 | End: 2023-07-03 | Stop reason: SDUPTHER

## 2023-06-30 RX ORDER — ESCITALOPRAM OXALATE 10 MG/1
10 TABLET ORAL DAILY
Qty: 30 TABLET | Refills: 2 | Status: SHIPPED | OUTPATIENT
Start: 2023-06-30 | End: 2023-07-03 | Stop reason: SDUPTHER

## 2023-06-30 RX ADMIN — ESCITALOPRAM OXALATE 10 MG: 10 TABLET ORAL at 08:07

## 2023-06-30 RX ADMIN — LAMOTRIGINE 25 MG: 25 TABLET ORAL at 08:06

## 2023-06-30 NOTE — PLAN OF CARE
Problem: SELF HARM/SUICIDALITY  Goal: Will have no self-injury during hospital stay  Description: INTERVENTIONS:  - Q 15 MINUTES: Routine safety checks  - Q WAKING SHIFT & PRN: Assess risk to determine if routine checks are adequate to maintain patient safety  - Encourage patient to participate actively in care by formulating a plan to combat response to suicidal ideation, identify supports and resources  6/30/2023 0758 by Alton Burt RN  Outcome: Progressing  6/30/2023 0757 by Alton Burt RN  Outcome: Progressing     Problem: DEPRESSION  Goal: Will be euthymic at discharge  Description: INTERVENTIONS:  - Administer medication as ordered  - Provide emotional support via 1:1 interaction with staff  - Encourage involvement in milieu/groups/activities  - Monitor for social isolation  6/30/2023 0758 by Alton Burt RN  Outcome: Progressing  6/30/2023 0757 by Alton Burt RN  Outcome: Progressing     Problem: ANXIETY  Goal: Will report anxiety at manageable levels  Description: INTERVENTIONS:  - Administer medication as ordered  - Teach and encourage coping skills  - Provide emotional support  - Assess patient/family for anxiety and ability to cope  6/30/2023 0758 by Alton Burt RN  Outcome: Progressing  6/30/2023 0757 by Alton Burt RN  Outcome: Progressing  Goal: By discharge: Patient will verbalize 2 strategies to deal with anxiety  Description: Interventions:  - Identify any obvious source/trigger to anxiety  - Staff will assist patient in applying identified coping technique/skills  - Encourage attendance of scheduled groups and activities  6/30/2023 0758 by Alton Burt RN  Outcome: Progressing  6/30/2023 0757 by Alton Burt RN  Outcome: Progressing     Problem: SLEEP DISTURBANCE  Goal: Will exhibit normal sleeping pattern  Description: Interventions:  -  Assess the patients sleep pattern, noting recent changes  - Administer medication as ordered  - Decrease environmental stimuli, including noise, as appropriate during the night  - Encourage the patient to actively participate in unit groups and or exercise during the day to enhance ability to achieve adequate sleep at night  - Assess the patient, in the morning, encouraging a description of sleep experience  6/30/2023 0758 by Win Doyle RN  Outcome: Progressing  6/30/2023 0757 by Win Doyle RN  Outcome: Progressing     Problem: DISCHARGE PLANNING - CARE MANAGEMENT  Goal: Discharge to post-acute care or home with appropriate resources  Description: INTERVENTIONS:  - Conduct assessment to determine patient/family and health care team treatment goals, and need for post-acute services based on payer coverage, community resources, and patient preferences, and barriers to discharge  - Address psychosocial, clinical, and financial barriers to discharge as identified in assessment in conjunction with the patient/family and health care team  - Arrange appropriate level of post-acute services according to patient’s   needs and preference and payer coverage in collaboration with the physician and health care team  - Communicate with and update the patient/family, physician, and health care team regarding progress on the discharge plan  - Arrange appropriate transportation to post-acute venues  6/30/2023 0758 by Win Doyle RN  Outcome: Progressing  6/30/2023 0757 by Win Doyle RN  Outcome: Progressing

## 2023-06-30 NOTE — DISCHARGE SUMMARY
Discharge Summary - 530 [facility] 24 y.o. male MRN: [id number]  Unit/Bed#: [facility] 342-01 Encounter: [id number]     Admission Date:   Admission Orders (From admission, onward)     Ordered        06/23/23 2209  ED TO DIFFERENT CAMPUS  [address] [facility] [address] UNIT or INPATIENT MEDICAL UNIT to [facility] UNIT (using Discharge Readmit Navigator) - Admit Patient to [facility] Unit  Once                            Discharge Date: No discharge date for patient encounter. Attending Psychiatrist: Alie Whittington MD    Reason for Admission/HPI:   History of Present Illness     Oliva Cruz is admitted to the behavioral health unit status post overdose with Benadryl. This is his first inpatient psychiatric admission, he has had no previous interaction with outpatient psychiatry or therapy other than a brief stint of marital counseling. He was intubated and placed in the ICU and treated on the medical service at [address] [place] [address] from 6/21 to 6/23. He reports that there are various stressors that led to his overdose and suicide attempt. 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 He had become overwhelmed with his work duties and had taken 1 month off to travel to the [facility] with his wife. He reports that while visiting here he began to have relationship issues with his wife and they were arguing more frequently. In fact, there was an argument that led to his overdose and suicide attempt. He states initially after the argument with his wife he began cutting his wrists although upon realizing he would not "bleed out" he decided to overdose on the Benadryl. He is ambivalent after his suicide attempt, states "it is what it is" when questioned about his feelings of surviving the overdose. He is humorous and jocular throughout the examination which appears to be a defense mechanism for him.   He does state that he has been feeling more depressed over the past few weeks although most notably over the last 1 week. He is does note that he has been experiencing some decreased sleep, difficulty initiating and maintaining sleep for which she had originally purchased the over-the-counter Benadryl. He reports that he has been having some anhedonia, not enjoying things although this has been chronic and pervasive. He denies any significant feelings of guilt initially although later does talk about feeling he has let his family down and has feelings of inadequacy about running his parents mortician business. He reports some decreased concentration and focus and does note some short-term memory difficulties particularly after the overdose. Denies any significant appetite changes. He denies any weight loss. He reports that he does not have any suicidal ideations presently. He denies any homicidal ideations. He does endorse significant anxiety symptoms, states that he will frequently feel tense, restless, keyed up and on edge. At times he will have panic attacks this was occurring more frequently over the past month. He reports that he does not have any auditory or visual hallucinations. No significant paranoia or other delusions are mentioned. He describes having some "mood swings" in which he will argue with his wife and she claims that he has "bipolar disorder" although he denies any overt manic or hypomanic symptoms. He denies any eating disorder or obsessive/compulsive symptoms other than sometimes compulsively wanting to count vehicles as he passes them while driving. He feels this does not interfere with his day-to-day activities or life. He does report some symptoms of PTSD, mainly flashbacks to arguments that he has had with his wife although denies any hypervigilance or increased startle response. No nightmares reported.     Hospital Course:   Mayte Condon was admitted to the inpatient psychiatric unit and was monitored closely. During the hospitalization he was attending individual therapy, group therapy, milieu therapy and occupational therapy. Patient admitted status post suicide attempt by overdose on Benadryl. When he initially arrived in the unit he was ambivalent about suicide attempt and isolative. He initiated treatment with Lamictal 25 mg for mood stabilization due to anger and irritability and Lexapro which was titrated to 10 mg daily for depression and anxiety. Gradually, his symptoms improved. Became more pleasant and cooperative. More interactive in the milieu with peers and staff members. Maintained better communication with his wife and family and they were able to alleviate and reduce some of his outside social stressors that include financial and work related issues. He was able to describe coping mechanisms that he learned. He had improvement in sleep, energy, appetite levels. Remained adamantly denying any suicidal or homicidal ideations. No presence of psychosis or mood irritability or mood swings. On day of discharge she was future oriented and goal directed, anticipate returning back to [facility] with his aunt for a period of time and then eventually moving back to [facility] to continue his work for his father's [employer]. He was able to contract for safety and reported he would reach out to crisis or emergency room if suicidal thoughts were to recur again - "I'm never going to do that again". Sherine Diaz agreed to continue medications and to follow-up with outpatient appointments.      Mental Status at time of Discharge:   Appearance:  casually dressed, adequate grooming   Behavior:  pleasant, cooperative, calm   Speech:  normal rate and volume   Mood:  euthymic   Affect:  brighter   Thought Process:  organized, logical, coherent, goal directed   Associations: intact associations   Thought Content:  no overt delusions   Perceptual Disturbances: no auditory hallucinations, no visual hallucinations, does not appear responding to internal stimuli   Risk Potential: Suicidal ideation - None at present  Homicidal ideation - None at present  Potential for aggression - No   Sensorium:  oriented to person, place and time/date   Memory:  recent and remote memory grossly intact   Consciousness:  alert and awake   Attention/Concentration: attention span and concentration are age appropriate   Insight:  improved and fair   Judgment: improved and fair   Gait/Station: normal gait/station   Motor Activity: no abnormal movements       Admission Diagnosis:    Principal Problem:    Current severe episode of major depressive disorder without prior episode ([address])  Active Problems:    Severe protein-calorie malnutrition ([address])    Tobacco use    Medical clearance for psychiatric admission    Anxiety disorder, unspecified      Discharge Diagnosis:     Principal Problem:    Current severe episode of major depressive disorder without prior episode ([address] )  Active Problems:    Severe protein-calorie malnutrition ([address] )    Tobacco use    Medical clearance for psychiatric admission    Anxiety disorder, unspecified  Resolved Problems:    Anticholinergic drug overdose    Prolonged Q-T interval on ECG          Laboratory Results: I have personally reviewed all pertinent laboratory/tests results    Most Recent Labs:   Lab Results   Component Value Date    WBC 6.45 06/25/2023    RBC 5.11 06/25/2023    HGB 15.6 06/25/2023    HCT 45.5 06/25/2023     06/25/2023    RDW 11.9 06/25/2023    NEUTROABS 4.24 06/25/2023    SODIUM 139 06/24/2023    K 4.1 06/24/2023     06/24/2023    CO2 26 06/24/2023    BUN 13 06/24/2023    CREATININE 1.01 06/24/2023    GLUC 94 06/24/2023    CALCIUM 9.5 06/24/2023    AST 17 06/24/2023    ALT 14 06/24/2023    ALKPHOS 35 06/24/2023    TP 7.2 06/24/2023    ALB 4.0 06/24/2023    TBILI 0.65 06/24/2023    CHOLESTEROL 127 06/24/2023    HDL 41 06/24/2023 TRIG 81 06/24/2023    LDLCALC 70 06/24/2023    [address] 06/24/2023    [id number] 0.878 06/24/2023       Discharge Medications:  See after visit summary for reconciled discharge medications provided to patient and family. Discharge instructions/Information to patient and family:   See after visit summary for information provided to patient and family. Provisions for Follow-Up Care:  See after visit summary for information related to follow-up care and any pertinent home health orders. Discharge on Two Antipsychotic Medications: No    Suicide/Homicide Risk Assessment:    Risk of Harm to Self:   The following ratings are based on assessment at the time of discharge  Demographic risk factors include: male, age: young adult (15-24)  Historical Risk Factors include: history of mood disorder, history of suicide attempt, self-mutilating behaviors  Current Specific Risk Factors include: recent inpatient psychiatric admission - being discharged today, recent serious suicide attempt  Protective Factors: no current suicidal ideation, stable mood, improved mood, improved anxiety symptoms, improved impulse control, ability to adapt to change, ability to manage anger well, ability to make plans for the future, no current suicidal plan or intent, family support established, being a parent, contact with caregivers, good health, having a desire to live  Weapons/Firearms: none. The following steps have been taken to ensure weapons are properly secured: not applicable  Based on today's assessment, Steven Shannon presents the following risk of harm to self: low    Risk of Harm to Others: The following ratings are based on assessment at the time of discharge  Demographic Risk Factors include: male, 15-23 years of age. Historical Risk Factors include: none.   Current Specific Risk Factors include: recent difficulty with impulse control  Protective Factors: no current homicidal ideation, good impulse control, stable mood, no current psychotic symptoms, compliant with medications, compliant with treatment, willing to continue psychiatric treatment, ability to adapt to change, supportive family, responsibilities and duties to others, being a parent  Weapons/Firearms: none. The following steps have been taken to ensure weapons are properly secured: not applicable  Based on today's assessment, Homa Rodriguez presents the following risk of harm to others: minimal      Discharge Statement   I spent 35 minutes discharging the patient. This time was spent on the day of discharge. I had direct contact with the patient on the day of discharge. Additional documentation is required if more than 30 minutes were spent on discharge.

## 2023-06-30 NOTE — DISCHARGE INSTR - APPOINTMENTS
Lisa Landry or Leticia, our Antonella and Oscar, will be calling you after your discharge, on the phone number that you provided. They will be available as an additional support, if needed. If you wish to speak with one of them, you may contact Lisa Landry at [phone] or Devan Olvera at [phone].

## 2023-06-30 NOTE — NURSING NOTE
Patient denies all signs and symptoms. He reports he is ready to be discharged. AVS was reviewed with the patient, placed in the blue folder. Blue folder was put with his belongings. Patient was also given his medication to get him started.

## 2023-06-30 NOTE — PROGRESS NOTES
Met with Kiel Manzo completed his relapse prevention. He was able to identify his symptoms and warning signs. He does not trust others so he does not have anyone listed as a support system. He did list coping skills. We reviewed the national crisis resources.

## 2023-06-30 NOTE — BH TRANSITION RECORD
Contact Information: If you have any questions, concerns, pended studies, tests and/or procedures, or emergencies regarding your inpatient behavioral health visit. Please contact [address] behavioral [facility] unit [facility] [phone]  and ask to speak to a , nurse or physician. A contact is available 24 hours/ 7 days a week at this number. Summary of Procedures Performed During your Stay:  Below is a list of major procedures performed during your hospital stay and a summary of results:  - Cardiac Procedures/Studies: ECG. Sinus rhythm with sinus arrhythmia with short IN  Rightward axis  Borderline ECG    Pending Studies (From admission, onward)    None        Please follow up on the above pending studies with your PCP and/or referring provider.

## 2023-06-30 NOTE — NURSING NOTE
Patient is withdrawn to his room and appears sad.   He denies SI/HI and A/V hallucinations and stated he is "ready for discharge."  He is med/meal compliant

## 2023-06-30 NOTE — DISCHARGE INSTR - OTHER ORDERS
Crisis Prevention  Emergency Assistance:  Call 911. Go to a Comprehensive Psychiatric Emergency Program (CPEP). Go to the emergency room at your local hospital.  Hotlines:  Crisis Text Line:  [facility] has partnered with [employer], an anonymous texting service available 24/7. Starting a conversation is easy. Text HFY5 to 768891. [facility] HOPEline:  [facility]’s 24/7 problem gambling and chemical dependency hotline. For Help and Hope call 2-697-6-SAMANTHA or text Isaac Meeks  984 Suicide & Crisis Lifeline: If your life or someone else's is in imminent danger, please call 911. If you are in crisis and need immediate help, please call: 772  Domestic Violence: If you or someone else is in a relationship is being controlled by another individual through verbal, physical, or sexual abuse, or other tactics, please call: [phone]  [facility] WELL:  [facility]’s free, confidential support and crisis intervention for anyone seeking help for mental health and/or substance misuse concerns, available 24/7. Text “WELL” to 53763 or call 2-383-[facility]-WELL.

## 2023-06-30 NOTE — SOCIAL WORK
REGAN spoke with Pt's father, Leigh Denis, to discuss discharge. Father stated he has spoken with pt on the phone and pt does appear to be at his baseline. Father stated he will have Pt's aunt or cousin pick him up upon discharge and they will accompany pt back to [facility].

## 2023-06-30 NOTE — NURSING NOTE
Pt visible on unit and social with peers/on phone. Denies psych symptoms and needs/concerns. Compliant with unit routines and care. No behavioral concerns this evening. Safety checks continue.

## 2023-06-30 NOTE — PROGRESS NOTES
06/30/23 1257   Other Referral/Resources/Interventions Provided:   Referrals Provided: Crisis Hotline;Declines resources   Discharge Communications   Discharge planning discussed with: Patient and Father   Freedom of Choice Yes   Transportation at Discharge? Yes  (Family pickup)   Transport at Discharge  Automobile   Dispatcher Contacted No   ETA of Transport 1400   Transfer Mode Ambulate   Accompanied by Family member   Contacts   Patient Contacts [address]   Relationship to Patient: Family   Contact Method Phone   Reason/Outcome Continuity of Care;Emergency Contact; Discharge Planning   [facility]   Would you like to participate in our [facility] service program?   Yes   Pt to D/C today. Pt denies SI/HI/AVH. Pt oriented x3. Pt to d/c to his aunt's home and his cousin will  upon discharge. Pt plans to return to [facility] upon discharge. Pt was provided with information for [facility] in the event he decides not to return to [facility]. Pt verbalized understanding of need to follow up to obtain insurance and to access [id number] [place] [address] in [facility]. Pt discharged with medication in hand.      Discharge Address: [id number] [facility], [address], [place], [address] [place] [address] Place  Phone: [phone]

## 2023-07-03 DIAGNOSIS — F39 MOOD DISORDER (HCC): ICD-10-CM

## 2023-07-03 DIAGNOSIS — F32.2 CURRENT SEVERE EPISODE OF MAJOR DEPRESSIVE DISORDER WITHOUT PSYCHOTIC FEATURES WITHOUT PRIOR EPISODE (HCC): Chronic | ICD-10-CM

## 2023-07-03 DIAGNOSIS — F41.9 ANXIETY DISORDER, UNSPECIFIED: Chronic | ICD-10-CM

## 2023-07-03 RX ORDER — LAMOTRIGINE 25 MG/1
25 TABLET ORAL DAILY
Qty: 30 TABLET | Refills: 0 | Status: SHIPPED | OUTPATIENT
Start: 2023-07-03 | End: 2023-10-01

## 2023-07-03 RX ORDER — ESCITALOPRAM OXALATE 10 MG/1
10 TABLET ORAL DAILY
Qty: 30 TABLET | Refills: 0 | Status: SHIPPED | OUTPATIENT
Start: 2023-07-03 | End: 2023-10-01

## 2023-07-28 DIAGNOSIS — F32.2 CURRENT SEVERE EPISODE OF MAJOR DEPRESSIVE DISORDER WITHOUT PSYCHOTIC FEATURES WITHOUT PRIOR EPISODE (HCC): Chronic | ICD-10-CM

## 2023-07-28 DIAGNOSIS — F39 MOOD DISORDER (HCC): ICD-10-CM

## 2023-07-28 DIAGNOSIS — F41.9 ANXIETY DISORDER, UNSPECIFIED: Chronic | ICD-10-CM

## 2023-07-28 RX ORDER — ESCITALOPRAM OXALATE 10 MG/1
10 TABLET ORAL DAILY
Qty: 30 TABLET | Refills: 0 | OUTPATIENT
Start: 2023-07-28

## 2023-07-28 RX ORDER — LAMOTRIGINE 25 MG/1
25 TABLET ORAL DAILY
Qty: 30 TABLET | Refills: 0 | OUTPATIENT
Start: 2023-07-28

## 2023-07-31 ENCOUNTER — TELEPHONE (OUTPATIENT)
Dept: PSYCHIATRY | Facility: CLINIC | Age: 23
End: 2023-07-31

## 2023-07-31 DIAGNOSIS — F32.2 CURRENT SEVERE EPISODE OF MAJOR DEPRESSIVE DISORDER WITHOUT PSYCHOTIC FEATURES WITHOUT PRIOR EPISODE (HCC): Chronic | ICD-10-CM

## 2023-07-31 DIAGNOSIS — F41.9 ANXIETY DISORDER, UNSPECIFIED: Chronic | ICD-10-CM

## 2023-07-31 DIAGNOSIS — F39 MOOD DISORDER (HCC): ICD-10-CM

## 2023-07-31 NOTE — TELEPHONE ENCOUNTER
shanika rec a call from patients aunt Nestor Elizabeth @ [phone] regarding patients last visit with provider 6/23 and would like a call back from provider regarding rxs that are needed lexapro 10 mg and LamoTRIGine 25mg to [facility]  In the [place]. Patient has an appt on 8/14 in the [place]. They would like a call back.     Thank you

## 2023-08-08 NOTE — TELEPHONE ENCOUNTER
Attempted to call Aristeo Blank to discuss refill request however phone is not in service. LM on his aunt's VM requesting she call the office back with pharmacy information.

## 2023-08-13 RX ORDER — LAMOTRIGINE 25 MG/1
25 TABLET ORAL DAILY
Qty: 30 TABLET | Refills: 0 | OUTPATIENT
Start: 2023-08-13

## 2023-08-13 RX ORDER — ESCITALOPRAM OXALATE 10 MG/1
10 TABLET ORAL DAILY
Qty: 30 TABLET | Refills: 0 | OUTPATIENT
Start: 2023-08-13

## 2024-01-01 NOTE — ASSESSMENT & PLAN NOTE
"· Patient alert on EMS arrival and reports taking thirty Benadryl 50 mg tablets to \"take a break\"  · Denies suicide attempt/ideation  · Evidence of self harm with cuts on his left arm  · Tonic clonic seizure en route to the hospital   · Exam consistent with anticholinergic OD  · Toxicology consulted, appreciate recommendations  · Nystagmus shortly after arrival to the ICU, given lorazepam 1 mg IVP  · Seizure precautions  · Plan for 1:1 continual observation and psych consult on extubation  "
"· Patient alert on EMS arrival and reports taking thirty Benadryl 50 mg tablets to \"take a break\"  · Denies suicide attempt/ideation  · Evidence of self harm with cuts on his left arm  · Tonic clonic seizure en route to the hospital   · Exam consistent with anticholinergic OD  · Toxicology consulted, appreciate recommendations  · Nystagmus shortly after arrival to the ICU, given lorazepam 1 mg IVP  · Seizure precautions  · Plan for 1:1 continual observation and psych consult on extubation  · PRN versed  · PRN Tylenol  · Continue IVF  "
Body mass index is 17 65 kg/m²      Nutrition service consult  Ensure supplement
Body mass index is 17 65 kg/m²      Nutrition service consult  Ensure supplement
Malnutrition Findings:                                 BMI Findings: Body mass index is 17 65 kg/m²     Appreciate Nutrition input
Malnutrition Findings:                                 BMI Findings: Body mass index is 17 65 kg/m²     Appreciate Nutrition input
Patient reported intentionally taking 30 tablets of Benadryl 50 mg  Evaluated by psychiatry and recommended for inpatient psychiatric rehab on discharge; involuntary if not voluntary  Patient is cleared for discharge pending IP mental health treatment  Appreciate CM input
Patient reported intentionally taking 30 tablets of Benadryl 50 mg  Evaluated by psychiatry and recommended for inpatient psychiatric rehab on discharge; involuntary if not voluntary  Patient is cleared for discharge pending IP mental health treatment  Discharge to [address] [place] [address]
Psych consulted  1:1; no utensils with food  Recommending IP psych unit  Medically cleared for IP unit
Psych consulted  1:1; no utensils with food  Recommending IP psych unit  Medically cleared for IP unit
Recent Labs     06/21/23  1358 06/21/23  1539 06/21/23  2148 06/21/23  2148   QTCINT 427 [phone]     Stable now
Recent Labs     06/21/23  1358 06/21/23  1539 06/21/23  2148 06/21/23  2148   QTCINT 427 [phone]     Stable now
Resp failure resolved; now extubated;   Complaining of sore throat
Resp failure resolved; now extubated;   Complaining of sore throat
Tobacco Use: High Risk (6/21/2023)    Patient History    • Smoking Tobacco Use: Some Days    • Smokeless Tobacco Use: Never    • Passive Exposure: Not on file     Tobacco cessation counseling provided for > 3 min  NRT ordered with patch and PRN nicorette gum
Tobacco Use: High Risk (6/24/2023)    Patient History    • Smoking Tobacco Use: Some Days    • Smokeless Tobacco Use: Never    • Passive Exposure: Not on file     Tobacco cessation counseling provided for > 3 min  NRT ordered with patch and PRN nicorette gum
· CO2 13 on chemistry and VBG showing mixed metabolic/respiratory acidosis  · IV hydration  · CO2 improved on repeat labs
· CO2 13 on chemistry and VBG showing mixed metabolic/respiratory acidosis  · IV hydration  · Monitor repeat VBG later this evening and repeat chemistry in the morning
· Initial QTc 554  · Repeat 427  · Per toxicology note give mag 2 g IV if remains >500, appears to be resolved now
· Initial QTc 554  · Repeat 427  · Per toxicology note give mag 2 g IV if remains >500, appears to be resolved now
· Intubated in the ED for airway protection  · Continue lung protective mechanical ventilation  · Monitor VBG  · Plan for SBT/SAT in the morning
· Intubated in the ED for airway protection  · Continue lung protective mechanical ventilation  · Monitor VBG  · Plan for SBT/SAT in the morning  · Propofol for sedation   · Fentanyl for pain
itching
